# Patient Record
Sex: FEMALE | Race: WHITE | NOT HISPANIC OR LATINO | Employment: OTHER | URBAN - METROPOLITAN AREA
[De-identification: names, ages, dates, MRNs, and addresses within clinical notes are randomized per-mention and may not be internally consistent; named-entity substitution may affect disease eponyms.]

---

## 2017-01-04 DIAGNOSIS — Z76.82 AWAITING ORGAN TRANSPLANT STATUS: Primary | ICD-10-CM

## 2017-02-16 ENCOUNTER — LAB VISIT (OUTPATIENT)
Dept: LAB | Facility: HOSPITAL | Age: 67
End: 2017-02-16
Payer: MEDICARE

## 2017-02-16 DIAGNOSIS — Z76.82 AWAITING ORGAN TRANSPLANT STATUS: ICD-10-CM

## 2017-02-16 PROCEDURE — 86832 HLA CLASS I HIGH DEFIN QUAL: CPT | Mod: PO

## 2017-02-16 PROCEDURE — 86833 HLA CLASS II HIGH DEFIN QUAL: CPT | Mod: PO

## 2017-03-17 LAB — HPRA INTERPRETATION: NORMAL

## 2017-03-30 LAB
CLASS I ANTIBODY COMMENTS - LUMINEX: NORMAL
CLASS II ANTIBODIES - LUMINEX: NEGATIVE
CPRA %: 0
SERUM COLLECTION DT - LUMINEX CLASS I: NORMAL
SERUM COLLECTION DT - LUMINEX CLASS II: NORMAL
SPCL1 TESTING DATE: NORMAL
SPCL2 TESTING DATE: NORMAL

## 2017-05-09 ENCOUNTER — LAB VISIT (OUTPATIENT)
Dept: LAB | Facility: HOSPITAL | Age: 67
End: 2017-05-09
Payer: MEDICARE

## 2017-05-09 DIAGNOSIS — Z76.82 AWAITING ORGAN TRANSPLANT STATUS: ICD-10-CM

## 2017-06-08 ENCOUNTER — LAB VISIT (OUTPATIENT)
Dept: LAB | Facility: HOSPITAL | Age: 67
End: 2017-06-08
Payer: MEDICARE

## 2017-06-08 DIAGNOSIS — Z76.82 AWAITING ORGAN TRANSPLANT STATUS: ICD-10-CM

## 2017-06-20 LAB — HPRA INTERPRETATION: NORMAL

## 2017-06-20 PROCEDURE — 86829 HLA CLASS I/II ANTIBODY QUAL: CPT | Mod: PO,TXP

## 2017-06-20 PROCEDURE — 86829 HLA CLASS I/II ANTIBODY QUAL: CPT | Mod: 91,PO,TXP

## 2017-07-05 DIAGNOSIS — Z76.82 ORGAN TRANSPLANT CANDIDATE: Primary | ICD-10-CM

## 2017-07-05 NOTE — PROGRESS NOTES
YEARLY LIST MANAGEMENT NOTE    Casandra Rankingoodasael's kidney transplant listing status reviewed.  Patient is due for follow-up appointments in August 2017.  Appointments will be scheduled per protocol.  HLA sample is current and being rec'd on a regular basis.

## 2017-07-11 NOTE — Clinical Note
July 11, 2017        Casandra Pack  [address]  [place] [address]         Dear Casandra Pack:    As part of our commitment to share important information with our transplant patients, we want to provide you with the most current kidney and kidney/pancreas transplant outcomes at the [facility] as published bi-annually by the Scientific Registry for Organ Recipients (SRTR).    For kidney transplants performed between 1/1/2014 and 6/30/2016 the 1-year patient and graft survival rates are as follows:   Kidney-Cadaveric Donor Kidney-Living Donor Kidney/Pancreas   Adults [facility] 1-Year Expected 1-Year [facility] 1-Year [facility] 1-Year Expected 1-Year National 1-Year [facility] 1-Year Expected 1-Year National 1-Year   Graft Survival 93.09% 94.31% 93.92% 97.83% 97.54% 97.75% 98.18% 96.73% 96.15%   Patient Survival 96.56% 96.91% 96.57% 100% 98.88% 98.83% 98.18% 97.52% 97.54%         To learn more about transplant outcomes in the [place] you can go to www.srtr.org.     Please call the Kidney Transplant Office at [phone] or [phone] should you have any questions or if:     Your insurance changes in any way   Your address or phone number changes   There are changes in your health   You are in the hospital or Emergency Room for any reason      Sincerely,  Kidney Transplant Team

## 2017-07-11 NOTE — LETTER
IMPORTANT      July 13, 2017    Casandra Pack  [address]  [place] [address]     Re: [id number]    Dear Mr/Mrs Pack,    Thank you for choosing [facility] as your health care provider.  We are committed to assisting you with timely insurance filing and payment of your account.  To protect your liability, updated insurance information must be given to us at the time of service and we should be notified immediately if      · Your insurance benefits/plan changes.   You become eligible for any other benefits   Your current plan/coverage terms.    Also, please bring in a copy of your insurance premium payment if you have one of the following types of insurance:    · Coverage from a skilled nursing plan.  · Coverage from the Affordable Healthcare Act Plan.  · Coverage from a COBRA plan  · Premium paid by the National Kidney Foundation.    To ensure we have the correct insurance (Medical/Pharmacy) on file and to answer any questions regarding your benefits, please call us at [phone] or [phone] and ask to speak to the kidney  indicated below:      Transplant Dept  Rukhsana Hunterney   Heart   Kerri Wilmamimaru   Kidney (A-K) and Lung  Matming Mtzclaudio    Kidney (L-Z)  Dilia Arredondo   Liver    We look forward to hearing from you soon.    Sincerely,      Transplant Financial Services

## 2017-07-13 NOTE — ADDENDUM NOTE
Encounter addended by: Yenny Elena on: 7/13/2017 10:22 AM<BR>    Actions taken: Letter status changed

## 2017-07-14 LAB — HPRA INTERPRETATION: NORMAL

## 2017-07-14 PROCEDURE — 86829 HLA CLASS I/II ANTIBODY QUAL: CPT | Mod: PO,TXP

## 2017-07-14 PROCEDURE — 86829 HLA CLASS I/II ANTIBODY QUAL: CPT | Mod: 91,PO,TXP

## 2017-08-01 DIAGNOSIS — Z76.82 ORGAN TRANSPLANT CANDIDATE: ICD-10-CM

## 2017-08-17 ENCOUNTER — LAB VISIT (OUTPATIENT)
Dept: LAB | Facility: HOSPITAL | Age: 67
End: 2017-08-17
Payer: MEDICARE

## 2017-08-17 DIAGNOSIS — Z76.82 AWAITING ORGAN TRANSPLANT STATUS: ICD-10-CM

## 2017-08-17 PROCEDURE — 86829 HLA CLASS I/II ANTIBODY QUAL: CPT | Mod: 91,PO,TXP

## 2017-08-17 PROCEDURE — 86829 HLA CLASS I/II ANTIBODY QUAL: CPT | Mod: PO,TXP

## 2017-09-11 LAB — HPRA INTERPRETATION: NORMAL

## 2017-09-13 ENCOUNTER — LAB VISIT (OUTPATIENT)
Dept: LAB | Facility: HOSPITAL | Age: 67
End: 2017-09-13
Payer: MEDICARE

## 2017-09-13 DIAGNOSIS — Z76.82 AWAITING ORGAN TRANSPLANT STATUS: ICD-10-CM

## 2017-09-13 PROCEDURE — 86829 HLA CLASS I/II ANTIBODY QUAL: CPT | Mod: 91,PO,TXP

## 2017-09-13 PROCEDURE — 86829 HLA CLASS I/II ANTIBODY QUAL: CPT | Mod: PO,TXP

## 2017-09-21 DIAGNOSIS — Z76.82 ORGAN TRANSPLANT CANDIDATE: Primary | ICD-10-CM

## 2017-09-27 LAB — HPRA INTERPRETATION: NORMAL

## 2017-10-06 ENCOUNTER — LAB VISIT (OUTPATIENT)
Dept: LAB | Facility: HOSPITAL | Age: 67
End: 2017-10-06
Payer: MEDICARE

## 2017-10-06 DIAGNOSIS — Z76.82 AWAITING ORGAN TRANSPLANT STATUS: ICD-10-CM

## 2017-11-06 LAB — HPRA INTERPRETATION: NORMAL

## 2017-11-06 PROCEDURE — 86829 HLA CLASS I/II ANTIBODY QUAL: CPT | Mod: 91,PO,TXP

## 2017-11-06 PROCEDURE — 86829 HLA CLASS I/II ANTIBODY QUAL: CPT | Mod: PO,TXP

## 2017-11-10 PROCEDURE — 86832 HLA CLASS I HIGH DEFIN QUAL: CPT | Mod: PO,TXP

## 2017-11-10 PROCEDURE — 86833 HLA CLASS II HIGH DEFIN QUAL: CPT | Mod: PO,TXP

## 2017-11-20 LAB
CLASS I ANTIBODY COMMENTS - LUMINEX: NORMAL
CLASS II ANTIBODIES - LUMINEX: NORMAL
CLASS II ANTIBODY COMMENTS - LUMINEX: NORMAL
CPRA %: 4
SERUM COLLECTION DT - LUMINEX CLASS I: NORMAL
SERUM COLLECTION DT - LUMINEX CLASS II: NORMAL
SPCL1 TESTING DATE: NORMAL
SPCL2 TESTING DATE: NORMAL
SPCLU TESTING DATE: NORMAL

## 2017-12-15 ENCOUNTER — HOSPITAL ENCOUNTER (OUTPATIENT)
Dept: RADIOLOGY | Facility: HOSPITAL | Age: 67
Discharge: HOME | End: 2017-12-15
Attending: NURSE PRACTITIONER
Payer: MEDICARE

## 2017-12-15 ENCOUNTER — OFFICE VISIT (OUTPATIENT)
Dept: TRANSPLANT | Facility: CLINIC | Age: 67
End: 2017-12-15
Payer: MEDICARE

## 2017-12-15 ENCOUNTER — HOSPITAL ENCOUNTER (OUTPATIENT)
Dept: CARDIOLOGY | Facility: CLINIC | Age: 67
Discharge: HOME | End: 2017-12-15
Payer: MEDICARE

## 2017-12-15 ENCOUNTER — OFFICE VISIT (OUTPATIENT)
Dept: OBSTETRICS AND GYNECOLOGY | Facility: CLINIC | Age: 67
End: 2017-12-15
Payer: MEDICARE

## 2017-12-15 VITALS
BODY MASS INDEX: 34.69 KG/M2 | DIASTOLIC BLOOD PRESSURE: 74 MMHG | HEIGHT: 69 IN | WEIGHT: 234.19 LBS | SYSTOLIC BLOOD PRESSURE: 128 MMHG

## 2017-12-15 VITALS
OXYGEN SATURATION: 97 % | RESPIRATION RATE: 16 BRPM | HEIGHT: 67 IN | BODY MASS INDEX: 36.95 KG/M2 | SYSTOLIC BLOOD PRESSURE: 155 MMHG | TEMPERATURE: 98 F | DIASTOLIC BLOOD PRESSURE: 69 MMHG | WEIGHT: 235.44 LBS | HEART RATE: 98 BPM

## 2017-12-15 DIAGNOSIS — N18.6 ESRD ON HEMODIALYSIS: Primary | Chronic | ICD-10-CM

## 2017-12-15 DIAGNOSIS — Z76.82 ORGAN TRANSPLANT CANDIDATE: ICD-10-CM

## 2017-12-15 DIAGNOSIS — E11.21 TYPE 2 DIABETES MELLITUS WITH DIABETIC NEPHROPATHY, UNSPECIFIED LONG TERM INSULIN USE STATUS: Chronic | ICD-10-CM

## 2017-12-15 DIAGNOSIS — Z01.419 WELL WOMAN EXAM WITH ROUTINE GYNECOLOGICAL EXAM: Primary | ICD-10-CM

## 2017-12-15 DIAGNOSIS — K27.4 PEPTIC ULCER DISEASE WITH HEMORRHAGE: ICD-10-CM

## 2017-12-15 DIAGNOSIS — I35.0 NONRHEUMATIC AORTIC VALVE STENOSIS: ICD-10-CM

## 2017-12-15 DIAGNOSIS — Z76.82 AWAITING ORGAN TRANSPLANT STATUS: ICD-10-CM

## 2017-12-15 DIAGNOSIS — Z86.19 HISTORY OF STAPH INFECTION: Chronic | ICD-10-CM

## 2017-12-15 DIAGNOSIS — L40.9 PSORIASIS: ICD-10-CM

## 2017-12-15 DIAGNOSIS — E03.9 ACQUIRED HYPOTHYROIDISM: ICD-10-CM

## 2017-12-15 DIAGNOSIS — Z99.2 ESRD ON HEMODIALYSIS: Primary | Chronic | ICD-10-CM

## 2017-12-15 LAB
DIASTOLIC DYSFUNCTION: YES
ESTIMATED PA SYSTOLIC PRESSURE: 37.34
RETIRED EF AND QEF - SEE NOTES: 65 (ref 55–65)
TRICUSPID VALVE REGURGITATION: ABNORMAL

## 2017-12-15 PROCEDURE — 71020 XR CHEST PA AND LATERAL: CPT | Mod: TC,TXP

## 2017-12-15 PROCEDURE — G0101 CA SCREEN;PELVIC/BREAST EXAM: HCPCS | Mod: S$PBB,,, | Performed by: OBSTETRICS & GYNECOLOGY

## 2017-12-15 PROCEDURE — 99999 PR PBB SHADOW E&M-EST. PATIENT-LVL III: CPT | Mod: PBBFAC,,, | Performed by: OBSTETRICS & GYNECOLOGY

## 2017-12-15 PROCEDURE — 71020 XR CHEST PA AND LATERAL: CPT | Mod: 26,TXP,, | Performed by: RADIOLOGY

## 2017-12-15 PROCEDURE — G0101 CA SCREEN;PELVIC/BREAST EXAM: HCPCS | Mod: PBBFAC | Performed by: OBSTETRICS & GYNECOLOGY

## 2017-12-15 PROCEDURE — 99213 OFFICE O/P EST LOW 20 MIN: CPT | Mod: PBBFAC,25 | Performed by: OBSTETRICS & GYNECOLOGY

## 2017-12-15 PROCEDURE — 99215 OFFICE O/P EST HI 40 MIN: CPT | Mod: S$PBB,TXP,, | Performed by: INTERNAL MEDICINE

## 2017-12-15 PROCEDURE — 99999 PR PBB SHADOW E&M-EST. PATIENT-LVL III: CPT | Mod: PBBFAC,TXP,, | Performed by: INTERNAL MEDICINE

## 2017-12-15 PROCEDURE — 99213 OFFICE O/P EST LOW 20 MIN: CPT | Mod: PBBFAC,25,27,TXP | Performed by: INTERNAL MEDICINE

## 2017-12-15 PROCEDURE — 93306 TTE W/DOPPLER COMPLETE: CPT | Mod: PBBFAC,TXP | Performed by: INTERNAL MEDICINE

## 2017-12-15 RX ORDER — TRAZODONE HYDROCHLORIDE 50 MG/1
50 TABLET ORAL NIGHTLY
COMMUNITY
Start: 2017-10-19 | End: 2020-08-07

## 2017-12-15 RX ORDER — FLUTICASONE FUROATE AND VILANTEROL TRIFENATATE 100; 25 UG/1; UG/1
1 POWDER RESPIRATORY (INHALATION)
COMMUNITY
Start: 2017-12-08 | End: 2020-09-21

## 2017-12-15 RX ORDER — DOXYCYCLINE 100 MG/1
CAPSULE ORAL
COMMUNITY
Start: 2017-10-20 | End: 2017-12-15

## 2017-12-15 RX ORDER — MOXIFLOXACIN HYDROCHLORIDE 400 MG/1
TABLET ORAL
COMMUNITY
Start: 2017-10-03 | End: 2017-12-15

## 2017-12-15 RX ORDER — PREDNISONE 50 MG/1
TABLET ORAL
COMMUNITY
Start: 2017-10-03 | End: 2017-12-15

## 2017-12-15 RX ORDER — HYDROCODONE BITARTRATE AND ACETAMINOPHEN 10; 325 MG/1; MG/1
TABLET ORAL
COMMUNITY
Start: 2017-10-04 | End: 2017-12-15

## 2017-12-15 RX ORDER — PREDNISONE 20 MG/1
TABLET ORAL
COMMUNITY
Start: 2017-10-20 | End: 2017-12-15

## 2017-12-15 RX ORDER — ASCORBIC ACID, THIAMINE, RIBOFLAVIN, NIACINAMIDE, PYRIDOXINE, FOLIC ACID, COBALAMIN, BIOTIN, PANTOTHENIC ACID 100; 1.5; 1.7; 20; 10; 1; 6; 300; 1 MG/1; MG/1; MG/1; MG/1; MG/1; MG/1; UG/1; UG/1; MG/1
TABLET, COATED ORAL
Refills: 11 | COMMUNITY
Start: 2017-10-19

## 2017-12-15 RX ORDER — LEFLUNOMIDE 20 MG/1
TABLET ORAL
COMMUNITY
Start: 2017-09-14 | End: 2019-03-15

## 2017-12-15 RX ORDER — BENZONATATE 100 MG/1
CAPSULE ORAL
Refills: 1 | COMMUNITY
Start: 2017-10-31 | End: 2017-12-15

## 2017-12-15 NOTE — PROGRESS NOTES
Transplant Recipient Adult Psychosocial Assessment Update    Casandra Pack  [address]   [place] [address]    Telephone Information:   Mobile [phone]   Home  [phone] (home)  Work  There is no work phone number on file.  E-mail  No e-mail address on record    Sex: female  YOB: 1950  Age: 67 y.o.    Encounter Date: 12/15/2017  [place] Citizen: yes  Primary Language: English   Needed: no    Emergency Contact:  Name: Daria Pack  Relationship: daughter  Address: [address] [place] [address]  Phone Numbers:  [phone] (home), n/a (work), [phone] (mobile)    Family/Social Support:   Number of dependents/: pt has a dog which will be cared for by family   Marital history:  once for43 yrs to current ; two children  Other family dynamics: Pt reports parents ; three  sisters and one brother who is living.   Pt reports supportive children and . Dtr is an RN and son is a NP.    Household Composition:    Pt and her , 67 y/o Rodriguez Pack live in their home together.  Rodriguez drives [phone].  They have a dog.    Do you and your caregivers have access to reliable transportation? yes  PRIMARY CAREGIVER: Daria Pack will be primary caregiver, phone number [phone].      provided in-depth information to patient and caregiver regarding pre- and post-transplant caregiver role.   strongly encourages patient and caregiver to have concrete plan regarding post-transplant care giving, including back-up caregiver(s) to ensure care giving needs are met as needed.    Patient and Caregiver states understanding all aspects of caregiver role/commitment and is able/willing/committed to being caregiver to the fullest extent necessary.    Patient and Caregiver verbalizes understanding of the education provided today and caregiver responsibilities.          remains available. Patient and Caregiver agree to contact  in a timely manner if concerns arise.      Able to take time off work without financial concerns: yes.     Additional Significant Others who will Assist with Transplant:  Name: Rodriguez Pack  Age: 66  City: [place] State: [place]  Relationship:   Does person drive? yes    Name: Billy Pack  [phone]  Age: 33  City: [place] State: [place]  Relationship: son  Does person drive? yes    Living Will: no  Healthcare Power of : no  Advance Directives on file: <<no information> per medical record.  Verbally reviewed LW/HCPA information.   provided patient with copy of LW/HCPA documents and provided education on completion of forms.    Living Donors: No. Education and resource information given to patient.    Highest Education Level: Attended College/Technical School  Reading Ability: college  Reports difficulty with: N/A  Learns Best By:  Verbal and written instruction     Status: no  VA Benefits: no     Working for Income: No  If no, reason not working: Patient Choice - Retired  Patient is retired from [employer]..    Spouse/Significant Other Employment: Retired    Disabled: yes: date disability began: 2006, due to: ESRD.    Monthly Income:  Other: $Total household $3600.00  Able to afford all costs now and if transplanted, including medications: yes  Patient and Caregiver verbalizes understanding of personal responsibilities related to transplant costs and the importance of having a financial plan to ensure that patients transplant costs are fully covered.       provided fundraising information/education. Patient and Caregiververbalizes understanding.   remains available.    Insurance:   Payor/Plan Subscr  Sex Relation Sub. Ins. ID Effective Group Num   1. MEDICARE - [place]* TITUS PACK 1950 Female  [id number]                                    [address]   2. [facility]* TITUS MILLAN 1950 Female  [id number] 1/1/17                                    [address]       Primary Insurance (for UNOS reporting): Public Insurance - Medicare FFS (Fee For Service)  Secondary Insurance (for UNOS reporting): Private Insurance  Patient and Caregiver verbalizes clear understanding that patient may experience difficulty obtaining and/or be denied insurance coverage post-surgery. This includes and is not limited to disability insurance, life insurance, health insurance, burial insurance, long term care insurance, and other insurances.      Patient and Caregiver also reports understanding that future health concerns related to or unrelated to transplantation may not be covered by patient's insurance.  Resources and information provided and reviewed.     Patient and Caregiver provides verbal permission to release any necessary information to outside resources for patient care and discharge planning.  Resources and information provided are reviewed.      Dialysis Adherence: Patient reports good compliance.  Pt has a history of good dialysis compliance reports.  Infusion Service: patient utilizing? no  Home Health: patient utilizing? no  DME: no  Pulmonary/Cardiac Rehab: none   ADLS:  Pt states ability to independently accomplish all adls.    Adherence:   Pt states current and expected compliance with all healthcare recommendations..  Adherence education and counseling provided.     Per History Section:  Past Medical History:   Diagnosis Date    Acquired hypothyroidism 8/17/2016    Chronic rheumatic arthritis     Remicade 9029-0227, stopped d/t recurrent infection (skin abscesses)    Diabetes mellitus, type 2 since 1978 (age 28)     ESRD from DM; since Mar 2016 on hemodialysis     Peptic ulcer disease with hemorrhage 2016    Psoriasis      Social History   Substance Use Topics    Smoking status: Never Smoker    Smokeless tobacco: Never Used    Alcohol use  No     History   Drug Use No     History   Sexual Activity    Sexual activity: Not Currently    Partners: Male    Birth control/ protection: Post-menopausal       Per Today's Psychosocial:  Tobacco: none, patient denies any use.  Alcohol: none, patient denies any use.  Illicit Drugs/Non-prescribed Medications: none, patient denies any use.    Patient and Caregiver states clear understanding of the potential impact of substance use as it relates to transplant candidacy and is aware of possible random substance screening.  Substance abstinence/cessation counseling, education and resources provided and reviewed.     Arrests/DWI/Treatment/Rehab: patient denies    Psychiatric History:    Mental Health: depression  Psychiatrist/Counselor: PCP prescribes antidepressants and ambien.  Medications:  lexapro and trazadone; she states she does not seem to worry about certain things as much.  Suicide/Homicide Issues: Pt denies current or past suicidal/homicidal ideation.   Safety at home: Pt denies any home safety concerns.    Knowledge: Patient and Caregiver states having clear understanding and realistic expectations regarding the potential risks and potential benefits of organ transplantation and organ donation and agrees to discuss with health care team members and support system members, as well as to utilize available resources and express questions and/or concerns in order to further facilitate the pt informed decision-making.  Resources and information provided and reviewed.    Patient and Caregiver is aware of [facility]'s affiliation and/or partnership with agencies in home health care, LTAC, SNF, [facility], and other hospitals and clinics.    Understanding: Patient and Caregiver reports having a clear understanding of the many lifetime commitments involved with being a transplant recipient, including costs, compliance, medications, lab work, procedures, appointments, concrete and financial planning, preparedness, timely and  "appropriate communication of concerns, abstinence (ETOH, tobacco, illicit non-prescribed drugs), adherence to all health care team recommendations, support system and caregiver involvement, appropriate and timely resource utilization and follow-through, mental health counseling as needed/recommended, and patient and caregiver responsibilities.  Social Service Handbook, resources and detailed educational information provided and reviewed.  Educational information provided.    Patient and Caregiver also reports current and expected compliance with health care regime and states having a clear understanding of the importance of compliance.      Patient and Caregiver reports a clear understanding that risks and benefits may be involved with organ transplantation and with organ donation.       Patient and Caregiver also reports clear understanding that psychosocial risk factors may affect patient, and include but are not limited to feelings of depression, generalized anxiety, anxiety regarding dependence on others, post traumatic stress disorder, feelings of guilt and other emotional and/or mental concerns, and/or exacerbation of existing mental health concerns.  Detailed resources provided and discussed.      Patient and Caregiver agrees to access appropriate resources in a timely manner as needed and/or as recommended, and to communicate concerns appropriately.  Patient and Caregiver also reports a clear understanding of treatment options available.     Patient and Caregiver received education in a group setting.   reviewed education, provided additional information, and answered questions.    Feelings or Concerns: Pt states she is concerned about being away from home for an extended period of time.     Coping:   Pt stated she is coping with dialysis and being listed for transplant.  Pt enjoys shopping, playing [employer], going to the [employer] and crocheting.  Stated she is "just waiting" for " transplant.    Goals: Feel better; get an enjoyable part time job.  Get off dialysis. Patient referred to Vocational Rehabilitation.    Interview Behavior: Patient and Caregiver presents as alert and oriented x 4, pleasant, good eye contact, well groomed, recall good, concentration/judgement good, average intelligence, calm, communicative, cooperative, asking and answering questions appropriately.   She was accompanied by her dtr Daria, who appeared to be supportive of pt's pursuit of transplant..          Transplant Social Work - Candidacy  Assessment/Plan:     Psychosocial Suitability: Patient presents as an acceptable candidate for kidney transplant at this time. Based on psychosocial risk factors, patient presents as low risk, due to absence of serious psychosocial risk factors.  Final risk assessment pending dialysis unit compliance check.      Recommendations/Additional Comments: Fundraising.  Pt would benefit from [address].    Meg Castanon LCSW

## 2017-12-15 NOTE — LETTER
December 15, 2017        Mara Tomas  [address]  [place] [address]  Phone: [phone]  Fax: [phone]             [facility]  [address]  [place] [address]  Phone: [phone]   Patient: Casandra Pack   MR Number: [id number]   YOB: 1950   Date of Visit: 12/15/2017       Dear Dr. Mara Tomas    Thank you for referring Casandra Pack to me for evaluation. Attached you will find relevant portions of my assessment and plan of care.    If you have questions, please do not hesitate to call me. I look forward to following Casandra Pack along with you.    Sincerely,    Oriana Stout MD    Enclosure    If you would like to receive this communication electronically, please contact [email or web address] or [phone] to request [employer] Link access.    [employer] Link is a tool which provides read-only access to select patient information with whom you have a relationship. Its easy to use and provides real time access to review your patients record including encounter summaries, notes, results, and demographic information.    If you feel you have received this communication in error or would no longer like to receive these types of communications, please e-mail [email or web address]

## 2017-12-15 NOTE — PROGRESS NOTES
Kidney Transplant Recipient Reevalulation    Referring Physician: Mara Tomas  Current Nephrologist: Mara Tomas  Waitlist Status: active  Dialysis Start Date: 3/17/2016    Subjective:     CC:  Annual reassessment of kidney transplant candidacy.    HPI:  Ms. Pack is a 67 y.o. year old White female with ESRD secondary to diabetic nephropathy (DM diagnosed at age 28).  She has been on the wait list for a kidney transplant at [facility] since 3/17/2016. Patient is currently on hemodialysis started on 3/17/16. Patient is dialyzing on TTS schedule.  Patient reports that she is tolerating dialysis well. States she will hypotension to the 90s/40s standing after dialysis couple times a month. She has a LUE AV fistula. Patient was last seen in initial RR on 8/17/16. She denies any recent hospitalizations or ED visits.    She is accompanied to visit by her daughter.     She doesn't really exercise. She had PNA from Sept until the beginning of Dec 2017 thus states she wasn't as active. She lives in a 1 story house without any stairs to climb. She will do bathroom cleaning, grocery shopping, and cooking but her  does the rest of the household chores. Occasionally she will have lower back pain but states she walks around the grocery store. Doesn't use a walker or cane.     Review of Systems   Constitutional: +fatigue bradley after dialysis; Denies fever/chills, night sweats  EENT: +vision problems; +wears eye glasses; Denies hearing problems, trouble swallowing.   Respiratory: Denies shortness of breath, dyspnea on exertion, orthopnea, wheezing, hemoptysis, denies known TB exposure or history of positive TB skin test  Cardiovascular: Denies chest pain, palpitations, history of MI, history of stroke, history of DVT  Gastrointestinal: Denies abdominal pain, nausea/vomiting/diarrhea/constipation. Denies history of GI bleeding or ulcers.   Genitourinary: +estimates residual UOP as 2 cups/day; +history of  "recurrent UTIs - estimates 10 lifetime UTIs but states they were occurring more often when she was working and holding her urine for long periods of time at work - retired since 2002; last one was ~8 months ago; Denies history of kidney stones, history of urinary obstruction, hematuria, dysuria, urinary frequency, incontinence  Musculoskeletal: +occasional back pain; RA - in remission, on leflunomide; Denies trouble moving extremities, denies joint pain or swelling  Skin: Denies history of skin cancer, denies rash, ulcerations  Heme/onc: Denies any history of cancer, denies history of coagulopathies or bleeding disorders  Endocrine: +thyroid disease; Denies unintentional weight loss/weight gain  Neurological: Denies tremors, seizures, dizziness, headaches, peripheral neuropathy  Psychiatric: +daughter states she is a "worry wort"; Denies anxiety, depression, SI/HI. Denies hallucinations or delusions.    Potential Donors: no     Medications:   Current Outpatient Prescriptions   Medication Sig Dispense Refill    allopurinol (ZYLOPRIM) 100 MG tablet Take 100 mg by mouth 3 (three) times a week.      amlodipine (NORVASC) 5 MG tablet Take 5 mg by mouth 2 (two) times daily.      atorvastatin (LIPITOR) 80 MG tablet Take 80 mg by mouth once daily.      cholecalciferol, vitamin D3, (VITAMIN D3) 2,000 unit Cap Take 1 capsule by mouth once a week.      DIALYVITE 100-1 mg Tab TK 1 T PO  QD  11    escitalopram oxalate (LEXAPRO) 5 MG Tab Take 5 mg by mouth once daily.      insulin glargine (LANTUS) 100 unit/mL injection Inject 35 Units into the skin every evening.      insulin lispro (HUMALOG) 100 unit/mL injection Inject 25 Units into the skin 3 (three) times daily before meals.      leflunomide (ARAVA) 20 MG Tab       levothyroxine (SYNTHROID, LEVOTHROID) 175 MCG tablet Take 175 mcg by mouth once daily.      pantoprazole (PROTONIX) 40 MG tablet Take 40 mg by mouth once daily.      traZODone (DESYREL) 50 MG tablet    " "   BREO ELLIPTA 100-25 mcg/dose diskus inhaler        No current facility-administered medications for this visit.          Objective:   body mass index is 36.83 kg/m².   BP (!) 155/69 (BP Location: Right arm, Patient Position: Sitting, BP Method: Medium (Automatic))   Pulse 98   Temp 98 °F (36.7 °C) (Oral)   Resp 16   Ht 5' 7.05" (1.703 m)   Wt 106.8 kg (235 lb 7.2 oz)   SpO2 97%   BMI 36.83 kg/m²       Physical Exam   General: No acute distress, well groomed, alert and oriented x 3  HEENT: Normocephalic, atraumatic, PERRLA, sclera anicteric, conjunctiva/corneas clear, EOM's intact bilaterally, external inspection of ears and nose normal, moist mucous membranes, no oral ulcerations/lesions   Neck: Supple, symmetrical, trachea midline, no masses, no carotid bruits, no JVD, thyroid is normal without nodules or enlargement   Respiratory: Clear to auscultation bilaterally, respirations unlabored, no rales/rhonchi/wheezing   Cardiovacular: Regular rate and rhythm, S1, S2 normal, no murmurs, no rubs or gallops   Gastrointestinal: Soft, obese, non-tender, bowel sounds normal, no masses, no palpable organomegaly  Extremities: No clubbing or cyanosis of upper extremities bilaterally, no pedal edema bilaterally; +2 bilateral radial pulses  Skin: warm and dry; no rash on exposed skin  Lymph nodes: Cervical and supraclavicular nodes normal   Neurologic: No focal neurologic deficits.   Musculoskeletal: moves all extremities without difficulty, FROM, 5/5 strength, ambulates without an assistive device  Psychiatric: Normal mood and affect. Responds appropriately to questions.  Access: LUE AVF +thrill/bruit     Labs:  Lab Results   Component Value Date    WBC 7.67 08/17/2016    HGB 11.6 (L) 08/17/2016    HCT 35.8 (L) 08/17/2016     08/17/2016    K 4.4 08/17/2016     08/17/2016    CO2 22 (L) 08/17/2016    BUN 44 (H) 08/17/2016    CREATININE 4.6 (H) 08/17/2016    EGFRNONAA 9.5 (A) 08/17/2016    CALCIUM 9.1 " 08/17/2016    PHOS 4.5 08/17/2016    ALBUMIN 3.2 (L) 08/17/2016    AST 27 08/17/2016    ALT 19 08/17/2016    .0 (H) 08/17/2016       No results found for: PREALBUMIN, BILIRUBINUA, GGT, AMYLASE, LIPASE, PROTEINUA, NITRITE, RBCUA, WBCUA    No results found for: HLAABCTYPE    Lab Results   Component Value Date    CPRA 4 10/03/2017    EM2KMCY Negative 04/04/2017    CIABCLM WEAK A43 10/03/2017    CIIAB DR16 10/03/2017    ABCMT WEAK DR15 10/03/2017       Labs were reviewed with the patient.    Pre-transplant Workup:   Reviewed with the patient.    Assessment:     1. ESRD from DM; since Mar 2016 on hemodialysis    2. Type 2 diabetes mellitus with diabetic nephropathy, unspecified long term insulin use status    3. History of recurrent skin staph infection    4. Acquired hypothyroidism    5. Psoriasis    6. Peptic ulcer disease with hemorrhage    7. Organ transplant candidate    8. Awaiting organ transplant status        Plan:     Transplant Candidacy:   Ms. Pack is a suitable kidney transplant candidate.  She remains in overall stable health, and will remain active on the transplant list.    Encouraged patient to seek living donors and have them contact the living donor coordinator.     Exercise: reminded Casandra of the importance of regular exercise for weight management, blood sugar and blood pressure management.  I also explained exercise has been shown to improve cardiovascular health, energy level, and sleep hygiene.  Lastly, I advised her that cardiovascular complications are leading cause of death for renal transplant recipients, and regular exercise can help lower this risk.      Oriana Stout MD       Follow-up:   In addition to the tests noted in the plan, Ms. Pack will continue to have reevaluation as per the standing pre-kidney transplant protocol:  1. Monthly blood for PRA  2. Annual return to clinic, except HIV positive, > 65 years of age, or pancreas transplant candidates who will be  scheduled to see transplant every 6 months while in pre-transplant phase  3. Annual re-testing: CXR, EKG, yearly mammograms for women over 40 and PSA for males over 40, cardiology follow-up as recommended by initial cardiology pre-transplant evaluation  4. Renal ultrasound every 2 years  5. Baseline colonoscopy after age 50 and repeated as recommended    UNOS Patient Status  Functional Status: 70% - Cares for self: unable to carry on normal activity or active work  Physical Capacity: No Limitations

## 2017-12-15 NOTE — LETTER
December 15, 2017      Yrn Varela MD  [address]  [facility] [address]           [facility] 5th Floor  [address]  [facility] [address]  Phone: [phone]          Patient: Casandra Pack   MR Number: [id number]   YOB: 1950   Date of Visit: 12/15/2017       Dear Dr. Yrn Varela:    Thank you for referring Casandra Pack to me for evaluation. Attached you will find relevant portions of my assessment and plan of care.    If you have questions, please do not hesitate to call me. I look forward to following Casandra Pack along with you.    Sincerely,    Maryam Hutchison MD    Enclosure  CC:  No Recipients    If you would like to receive this communication electronically, please contact [email or web address] or [phone] to request more information on [employer] Link access.    For providers and/or their staff who would like to refer a patient to [facility], please contact us through our one-stop-shop provider referral line, [facility], at [phone].    If you feel you have received this communication in error or would no longer like to receive these types of communications, please e-mail [email or web address]

## 2017-12-15 NOTE — PROGRESS NOTES
History & Physical  Gynecology      SUBJECTIVE:     Chief Complaint: Gynecologic Exam       History of Present Illness:  Annual Exam-Postmenopausal  Patient presents for annual exam. The patient has no complaints today. Patient denies post-menopausal vaginal bleeding.   The patient is not sexually active. The patient is not taking hormone replacement therapy.    GYN screening history: last pap: approximate date  and was normal.  Patient has had a hysterectomy for bleeding.  Has never had an abnormal pap smear.  Last colonoscopy was , return three years per patient.  Last mammogram 2017 per patient.  DXA scan done this year with primary care doctor.      The patient participates in regular exercise: no.  Patient has long days in dialysis.  The patient is taking vitamin D supplementation.  She does not smoke.     FH:   Breast cancer: none  Colon cancer: mother and sister  Ovarian cancer: none    Review of patient's allergies indicates:   Allergen Reactions    Levaquin [levofloxacin]        Past Medical History:   Diagnosis Date    Acquired hypothyroidism 2016    Chronic rheumatic arthritis     Remicade 6148-0812, stopped d/t recurrent infection (skin abscesses)    Diabetes mellitus, type 2 since  (age 28)     ESRD from DM; since Mar 2016 on hemodialysis     Peptic ulcer disease with hemorrhage 2016    Psoriasis      Past Surgical History:   Procedure Laterality Date    APPENDECTOMY      AV FISTULA PLACEMENT Left     upper arm    CATARACT EXTRACTION Bilateral     CERVICAL SPINE SURGERY      for ruptured disc    CYST REMOVAL Left     calf, medial aspect LLE    gout deposit removal Right     foot    HYSTERECTOMY      for bleeding; ovaries in place    INCISION AND DRAINAGE OF WOUND      x 6 since     ROTATOR CUFF REPAIR Left     TONSILLECTOMY       OB History      Para Term  AB Living    2 2 2     2    SAB TAB Ectopic Multiple Live Births            2        Family  History   Problem Relation Age of Onset    Cancer Mother 55     colon    Colon cancer Mother     Aneurysm Father     Heart disease Sister     Diabetes Sister     Hypertension Sister     Heart disease Brother     Cancer Sister      lung    Diabetes Sister     Hypertension Sister     Cancer Sister 74     colon    Diabetes Sister     Hypertension Sister     Kidney disease Neg Hx     Breast cancer Neg Hx     Ovarian cancer Neg Hx      Social History   Substance Use Topics    Smoking status: Never Smoker    Smokeless tobacco: Never Used    Alcohol use No       Current Outpatient Prescriptions   Medication Sig    allopurinol (ZYLOPRIM) 100 MG tablet Take 100 mg by mouth 3 (three) times a week.    amlodipine (NORVASC) 5 MG tablet Take 5 mg by mouth 2 (two) times daily.    atorvastatin (LIPITOR) 80 MG tablet Take 80 mg by mouth once daily.    benzonatate (TESSALON) 100 MG capsule     BREO ELLIPTA 100-25 mcg/dose diskus inhaler     cholecalciferol, vitamin D3, (VITAMIN D3) 2,000 unit Cap Take 1 capsule by mouth once a week.    DIALYVITE 100-1 mg Tab TK 1 T PO  QD    doxycycline (VIBRAMYCIN) 100 MG Cap     escitalopram oxalate (LEXAPRO) 5 MG Tab Take 5 mg by mouth once daily.    folic acid (FOLVITE) 1 MG tablet Take 1 mg by mouth once daily.    hydrocodone-acetaminophen 10-325mg (NORCO)  mg Tab     insulin glargine (LANTUS) 100 unit/mL injection Inject 35 Units into the skin every evening.    insulin lispro (HUMALOG) 100 unit/mL injection Inject 25 Units into the skin 3 (three) times daily before meals.    leflunomide (ARAVA) 20 MG Tab     levothyroxine (SYNTHROID, LEVOTHROID) 175 MCG tablet Take 175 mcg by mouth once daily.    moxifloxacin (AVELOX) 400 mg tablet     mupirocin (BACTROBAN) 2 % ointment Apply to inside of nose 3 times daily x 5 days the week of surgery    pantoprazole (PROTONIX) 40 MG tablet Take 40 mg by mouth once daily.    predniSONE (DELTASONE) 20 MG tablet      predniSONE (DELTASONE) 50 MG Tab     torsemide (DEMADEX) 20 MG Tab Take 20 mg by mouth once daily.    traZODone (DESYREL) 50 MG tablet     zolpidem (AMBIEN) 10 mg Tab Take 5 mg by mouth nightly as needed.     No current facility-administered medications for this visit.        Review of Systems:  Review of Systems   Constitutional: Negative for appetite change, fever and unexpected weight change.   Respiratory: Negative for shortness of breath.    Cardiovascular: Negative for chest pain.   Gastrointestinal: Negative for constipation, diarrhea, nausea and vomiting.   Genitourinary: Negative for frequency, genital sores, pelvic pain, urgency, vaginal bleeding, vaginal discharge, vaginal pain, urinary incontinence, postmenopausal bleeding and vaginal odor.   Breast: Negative for breast mass, breast pain, nipple discharge and skin changes       OBJECTIVE:     Physical Exam:  Physical Exam   Constitutional: She is oriented to person, place, and time. She appears well-developed and well-nourished.   Pulmonary/Chest: Effort normal.   Abdominal: Soft.   Genitourinary: Vagina normal. No labial fusion. There is no rash, tenderness, lesion or injury on the right labia. There is no rash, tenderness, lesion or injury on the left labia. Right adnexum displays no mass, no tenderness and no fullness. Left adnexum displays no mass, no tenderness and no fullness. No erythema, tenderness or bleeding in the vagina. No foreign body in the vagina. No signs of injury around the vagina. No vaginal discharge found.   Genitourinary Comments: prolapse   Neurological: She is alert and oriented to person, place, and time.   Psychiatric: She has a normal mood and affect. Her behavior is normal. Judgment and thought content normal.   Nursing note and vitals reviewed.        ASSESSMENT:       ICD-10-CM ICD-9-CM    1. Well woman exam with routine gynecological exam Z01.419 V72.31           Plan:      Casandra was seen today for gynecologic  exam.    Diagnoses and all orders for this visit:    Well woman exam with routine gynecological exam        No orders of the defined types were placed in this encounter.      Well Woman:   - Pap smear: no longer required  - Mammogram: up to date  - Colonoscopy: up to date  - Dexa: up to date  - Immunizations: primary care to manage  - Labs: primary care to manage  - Calcium/Vitamin D counseling provided      Return in about 2 years (around 12/15/2019) for annual or prn.    Maryam Hutchison

## 2017-12-20 NOTE — PROGRESS NOTES
Patient's chart reviewed today. Patient due for follow-up in June 2018. Pt stated mmg completed 6/2017 & will fax copy of results from [facility]. Future appointments will be scheduled per protocol. HLA sample current, in lab, and being received on a regular basis.

## 2018-01-15 ENCOUNTER — LAB VISIT (OUTPATIENT)
Dept: LAB | Facility: HOSPITAL | Age: 68
End: 2018-01-15
Payer: MEDICARE

## 2018-01-15 DIAGNOSIS — Z76.82 AWAITING ORGAN TRANSPLANT STATUS: ICD-10-CM

## 2018-01-15 PROCEDURE — 86832 HLA CLASS I HIGH DEFIN QUAL: CPT | Mod: PO,TXP

## 2018-01-15 PROCEDURE — 86833 HLA CLASS II HIGH DEFIN QUAL: CPT | Mod: PO,TXP

## 2018-01-24 LAB — HPRA INTERPRETATION: NORMAL

## 2018-02-06 LAB
CLASS I ANTIBODIES - LUMINEX: NEGATIVE
CLASS II ANTIBODY COMMENTS - LUMINEX: NORMAL
CPRA %: 0
SERUM COLLECTION DT - LUMINEX CLASS I: NORMAL
SERUM COLLECTION DT - LUMINEX CLASS II: NORMAL
SPCL1 TESTING DATE: NORMAL
SPCL2 TESTING DATE: NORMAL
SPCLU TESTING DATE: NORMAL

## 2018-04-06 ENCOUNTER — LAB VISIT (OUTPATIENT)
Dept: LAB | Facility: HOSPITAL | Age: 68
End: 2018-04-06
Payer: MEDICARE

## 2018-04-06 DIAGNOSIS — Z76.82 ORGAN TRANSPLANT CANDIDATE: ICD-10-CM

## 2018-04-06 PROCEDURE — 86832 HLA CLASS I HIGH DEFIN QUAL: CPT | Mod: PO,TXP

## 2018-04-06 PROCEDURE — 86833 HLA CLASS II HIGH DEFIN QUAL: CPT | Mod: PO,TXP

## 2018-04-10 LAB — HPRA INTERPRETATION: NORMAL

## 2018-05-18 DIAGNOSIS — Z76.82 ORGAN TRANSPLANT CANDIDATE: Primary | ICD-10-CM

## 2018-09-13 ENCOUNTER — HOSPITAL ENCOUNTER (OUTPATIENT)
Dept: RADIOLOGY | Facility: HOSPITAL | Age: 68
Discharge: HOME | End: 2018-09-13
Attending: TRANSPLANT SURGERY
Payer: MEDICARE

## 2018-09-13 ENCOUNTER — HOSPITAL ENCOUNTER (OUTPATIENT)
Dept: RADIOLOGY | Facility: HOSPITAL | Age: 68
Discharge: HOME | End: 2018-09-13
Attending: NURSE PRACTITIONER
Payer: MEDICARE

## 2018-09-13 ENCOUNTER — TELEPHONE (OUTPATIENT)
Dept: CARDIOLOGY | Facility: CLINIC | Age: 68
End: 2018-09-13

## 2018-09-13 DIAGNOSIS — Z76.82 ORGAN TRANSPLANT CANDIDATE: ICD-10-CM

## 2018-09-13 PROCEDURE — 93978 VASCULAR STUDY: CPT | Mod: TC,TXP

## 2018-09-13 PROCEDURE — 76770 US EXAM ABDO BACK WALL COMP: CPT | Mod: 26,TXP,, | Performed by: RADIOLOGY

## 2018-09-13 PROCEDURE — 93978 VASCULAR STUDY: CPT | Mod: 26,TXP,, | Performed by: RADIOLOGY

## 2018-09-13 PROCEDURE — 71046 X-RAY EXAM CHEST 2 VIEWS: CPT | Mod: 26,TXP,, | Performed by: RADIOLOGY

## 2018-09-13 PROCEDURE — 71046 X-RAY EXAM CHEST 2 VIEWS: CPT | Mod: TC,TXP

## 2018-09-13 PROCEDURE — 76770 US EXAM ABDO BACK WALL COMP: CPT | Mod: TC,TXP

## 2018-09-14 ENCOUNTER — OFFICE VISIT (OUTPATIENT)
Dept: TRANSPLANT | Facility: CLINIC | Age: 68
End: 2018-09-14
Payer: MEDICARE

## 2018-09-14 ENCOUNTER — CLINICAL SUPPORT (OUTPATIENT)
Dept: CARDIOLOGY | Facility: CLINIC | Age: 68
End: 2018-09-14
Attending: NURSE PRACTITIONER
Payer: MEDICARE

## 2018-09-14 VITALS
OXYGEN SATURATION: 98 % | RESPIRATION RATE: 18 BRPM | BODY MASS INDEX: 35.59 KG/M2 | DIASTOLIC BLOOD PRESSURE: 69 MMHG | SYSTOLIC BLOOD PRESSURE: 158 MMHG | HEIGHT: 69 IN | WEIGHT: 240.31 LBS | HEART RATE: 89 BPM | TEMPERATURE: 98 F

## 2018-09-14 DIAGNOSIS — Z76.82 ORGAN TRANSPLANT CANDIDATE: Primary | ICD-10-CM

## 2018-09-14 DIAGNOSIS — M06.9 RHEUMATOID ARTHRITIS OF HAND, UNSPECIFIED LATERALITY, UNSPECIFIED RHEUMATOID FACTOR PRESENCE: ICD-10-CM

## 2018-09-14 DIAGNOSIS — E03.9 ACQUIRED HYPOTHYROIDISM: ICD-10-CM

## 2018-09-14 DIAGNOSIS — N18.6 ESRD ON HEMODIALYSIS: Chronic | ICD-10-CM

## 2018-09-14 DIAGNOSIS — Z76.82 ORGAN TRANSPLANT CANDIDATE: ICD-10-CM

## 2018-09-14 DIAGNOSIS — E11.21 TYPE 2 DIABETES MELLITUS WITH DIABETIC NEPHROPATHY, UNSPECIFIED WHETHER LONG TERM INSULIN USE: Chronic | ICD-10-CM

## 2018-09-14 DIAGNOSIS — Z99.2 ESRD ON HEMODIALYSIS: Chronic | ICD-10-CM

## 2018-09-14 LAB — DIASTOLIC DYSFUNCTION: NO

## 2018-09-14 PROCEDURE — 78452 HT MUSCLE IMAGE SPECT MULT: CPT | Mod: PBBFAC,TXP | Performed by: INTERNAL MEDICINE

## 2018-09-14 PROCEDURE — 93018 CV STRESS TEST I&R ONLY: CPT | Mod: S$PBB,TXP,, | Performed by: INTERNAL MEDICINE

## 2018-09-14 PROCEDURE — 99999 PR PBB SHADOW E&M-EST. PATIENT-LVL V: CPT | Mod: PBBFAC,TXP,, | Performed by: NURSE PRACTITIONER

## 2018-09-14 PROCEDURE — 99215 OFFICE O/P EST HI 40 MIN: CPT | Mod: S$PBB,TXP,, | Performed by: NURSE PRACTITIONER

## 2018-09-14 PROCEDURE — 93016 CV STRESS TEST SUPVJ ONLY: CPT | Mod: S$PBB,TXP,, | Performed by: INTERNAL MEDICINE

## 2018-09-14 PROCEDURE — 99215 OFFICE O/P EST HI 40 MIN: CPT | Mod: PBBFAC,TXP | Performed by: NURSE PRACTITIONER

## 2018-09-14 RX ORDER — TORSEMIDE 5 MG/1
20 TABLET ORAL DAILY
COMMUNITY
End: 2020-08-07

## 2018-09-14 RX ORDER — SEVELAMER CARBONATE 800 MG/1
2400 TABLET, FILM COATED ORAL
COMMUNITY

## 2018-09-14 NOTE — PROGRESS NOTES
Patient was seen in listed [facility] transplant clinic for continued evaluation for kidney, kidney/pancreas or pancreas only transplant. The patient attended a group education session that discussed/reviewed the following aspects of transplantation: evaluation and selection committee process, UNOS waitlist management/multiple listings, types of organs offered (KDPI < 85%, KDPI > 85%, PHS increased risk, DCD), financial aspects, surgical procedures, dietary instruction pre- and post-transplant, health maintenance pre- and post-transplant, post-transplant hospitalization and outpatient follow-up, potential to participate in a research protocol, and medication management and side effects. A question and answer session was provided after the presentation.     The patient was seen by all members of the multi-disciplinary team to include: Nephrologist/PA, , Transplant Coordinator, and .      The patient reviewed and signed all consents for evaluation which were witnessed and sent to scanning into the EPIC chart.     The patient was given an education book and plan for further evaluation based on her individual assessment.      The patient was encouraged to call with any questions or concerns.

## 2018-09-14 NOTE — PROGRESS NOTES
Kidney Transplant Recipient Reevalulation    Referring Physician: Mara Tomas  Current Nephrologist: Mara Tomas  Waitlist Status: active  Dialysis Start Date: 3/17/2016    Subjective:     CC:  Six month reassessment of kidney transplant candidacy.    HPI:  Ms. Pack is a 67 y.o. year old White female with ESRD secondary to diabetic nephropathy.  She has been on the wait list for a kidney transplant at [facility] since 3/17/2016. Patient is currently on hemodialysis started on 3/17/2016. Patient is dialyzing on TTS schedule.  Patient reports that she is tolerating dialysis well.. Dialyzes for 4 hours per session.  She has a LUE AV fistula. Patient denies any recent hospitalizations or ED visits.    Recent labs and diagnostic tests reviewed.   Does not exercise but will does hold chores and run errands.   Overall feels well. No health concerns today.   Denies chest pain, SOB, leg pain, abdominal pain or LUTs.    Past Medical History:   Diagnosis Date    Acquired hypothyroidism 8/17/2016    Chronic rheumatic arthritis     Remicade 8929-3045, stopped d/t recurrent infection (skin abscesses)    Diabetes mellitus, type 2 since 1978 (age 28)     ESRD from DM; since Mar 2016 on hemodialysis     Peptic ulcer disease with hemorrhage 2016    Psoriasis        Review of Systems   Constitutional: Negative for activity change, appetite change, chills, fatigue, fever and unexpected weight change.   HENT: Negative for congestion, facial swelling, postnasal drip, rhinorrhea, sinus pressure, sore throat and trouble swallowing.    Eyes: Positive for visual disturbance. Negative for pain and redness.        Wears glasses   Respiratory: Negative for cough, chest tightness, shortness of breath and wheezing.    Cardiovascular: Positive for leg swelling. Negative for chest pain and palpitations.   Gastrointestinal: Positive for abdominal distention. Negative for abdominal pain, diarrhea, nausea and vomiting.         "Obese abdomen    Genitourinary: Negative for dysuria, flank pain and urgency.   Musculoskeletal: Negative for gait problem, neck pain and neck stiffness.   Skin: Negative for rash.   Allergic/Immunologic: Negative for environmental allergies, food allergies and immunocompromised state.   Neurological: Negative for dizziness, weakness, light-headedness and headaches.   Psychiatric/Behavioral: Negative for agitation and confusion. The patient is not nervous/anxious.        Objective:   body mass index is 35.49 kg/m².  BP (!) 158/69 (BP Location: Right arm, Patient Position: Sitting, BP Method: Medium (Automatic))   Pulse 89   Temp 97.5 °F (36.4 °C) (Oral)   Resp 18   Ht 5' 9" (1.753 m)   Wt 109 kg (240 lb 4.8 oz)   SpO2 98%   BMI 35.49 kg/m²     Physical Exam   Constitutional: She is oriented to person, place, and time. She appears well-developed and well-nourished.   HENT:   Head: Normocephalic.   Mouth/Throat: Oropharynx is clear and moist. No oropharyngeal exudate.   Eyes: Conjunctivae and EOM are normal. Pupils are equal, round, and reactive to light. No scleral icterus.   Neck: Normal range of motion. Neck supple.   Cardiovascular: Normal rate, regular rhythm and normal heart sounds.   Pulmonary/Chest: Effort normal and breath sounds normal.   Abdominal: Soft. Normal appearance and bowel sounds are normal. She exhibits no distension and no mass. There is no splenomegaly or hepatomegaly. There is no tenderness. There is no rebound, no guarding, no CVA tenderness, no tenderness at McBurney's point and negative Cao's sign.   Musculoskeletal: Normal range of motion. She exhibits edema.        Arms:  bilateral trace peripheral edema    Lymphadenopathy:     She has no cervical adenopathy.   Neurological: She is alert and oriented to person, place, and time. She exhibits normal muscle tone. Coordination normal.   Skin: Skin is warm and dry.   Psychiatric: She has a normal mood and affect. Her behavior is " normal.   Vitals reviewed.      Labs:  Lab Results   Component Value Date    WBC 7.67 08/17/2016    HGB 11.6 (L) 08/17/2016    HCT 35.8 (L) 08/17/2016     08/17/2016    K 4.4 08/17/2016     08/17/2016    CO2 22 (L) 08/17/2016    BUN 44 (H) 08/17/2016    CREATININE 4.6 (H) 08/17/2016    EGFRNONAA 9.5 (A) 08/17/2016    CALCIUM 9.1 08/17/2016    PHOS 4.5 08/17/2016    ALBUMIN 3.2 (L) 08/17/2016    AST 27 08/17/2016    ALT 19 08/17/2016    .0 (H) 08/17/2016       No results found for: PREALBUMIN, BILIRUBINUA, GGT, AMYLASE, LIPASE, PROTEINUA, NITRITE, RBCUA, WBCUA    No results found for: HLAABCTYPE    Lab Results   Component Value Date    CPRA 4 07/10/2018    CPRA 4 07/10/2018    CPRA 0 07/10/2018    GM8FXDJ Negative 07/10/2018    CIABCLM WEAK A43 10/03/2017    CIIAB DR16 07/10/2018    ABCMT WEAK-- DR15 07/10/2018       Labs were reviewed with the patient.    Pre-transplant Workup:   Reviewed with the patient.    Assessment:     1. Organ transplant candidate    2. ESRD from DM; since Mar 2016 on hemodialysis    3. Type 2 diabetes mellitus with diabetic nephropathy, unspecified whether long term insulin use    4. Rheumatoid arthritis of hand, unspecified laterality, unspecified rheumatoid factor presence    5. Acquired hypothyroidism    6. BMI 35.0-35.9,adult        Plan:   Needs UTD MMG, last on on 8/2017 Pt will get done in 10/2018  colonoscopy due 3/2020  GYN apt due 12/2018  S/P hysterectomy 1989--does not need PAP  NM stress today--results pending    Transplant Candidacy:   Ms. Pack is a suitable kidney transplant candidate.  She remains in overall stable health, and will remain active on the transplant list.    Kathryn Leal NP       Follow-up:   In addition to the tests noted in the plan, Ms. Pack will continue to have reevaluation as per the standing pre-kidney transplant protocol:  1. Monthly blood for PRA  2. Annual return to clinic, except HIV positive, > 65 years of age, or  pancreas transplant candidates who will be scheduled to see transplant every 6 months while in pre-transplant phase  3. Annual re-testing: CXR, EKG, yearly mammograms for women over 40 and PSA for males over 40, cardiology follow-up as recommended by initial cardiology pre-transplant evaluation  4. Renal ultrasound every 2 years  5. Baseline colonoscopy after age 50 and repeated as recommended    UNOS Patient Status  Functional Status: 60% - Requires occasional assistance but is able to care for needs  Physical Capacity: No Limitations

## 2018-09-14 NOTE — LETTER
September 17, 2018        Mara Tomas  [address]  [place] [address]  Phone: [phone]  Fax: [phone]             [facility]  [address]  [place] [address]  Phone: [phone]   Patient: Casandra Pack   MR Number: [id number]   YOB: 1950   Date of Visit: 9/14/2018       Dear Dr. Mara Tomas    Thank you for referring Casandra Pack to me for evaluation. Attached you will find relevant portions of my assessment and plan of care.    If you have questions, please do not hesitate to call me. I look forward to following Casandra Pack along with you.    Sincerely,    Kathryn Leal, NP    Enclosure    If you would like to receive this communication electronically, please contact [email or web address] or [phone] to request [employer] Link access.    [employer] Link is a tool which provides read-only access to select patient information with whom you have a relationship. Its easy to use and provides real time access to review your patients record including encounter summaries, notes, results, and demographic information.    If you feel you have received this communication in error or would no longer like to receive these types of communications, please e-mail [email or web address]

## 2018-09-17 NOTE — PROGRESS NOTES
Transplant Recipient Adult Psychosocial Assessment Update    Casandra Pack  [address]   [place] [address]    Telephone Information:   Mobile [phone]   Home  [phone] (home)  Work  There is no work phone number on file.  E-mail  No e-mail address on record    Sex: female  YOB: 1950  Age: 67 y.o.    Encounter Date: 2018  [place] Citizen: yes  Primary Language: English   Needed: no    Emergency Contact:  Name: Daria Pack  Relationship: daughter  Address: [address] [place] [address]  Phone Numbers:  [phone] (home), n/a (work), [phone] (mobile)    Family/Social Support:   Number of dependents/: pt has a dog which will be cared for by family   Marital history:  once for 44 yrs to current ; two children  Other family dynamics: Pt reports parents ; three  sisters and one brother who is living.   Pt reports supportive children and . Dtr is an RN and son is a NP.    Household Composition:    Pt and her , 68 y/o Rodriguez Pack live in their home together.  Rodriguez drives [phone].  They have a dog.    Do you and your caregivers have access to reliable transportation? yes  PRIMARY CAREGIVER: Daria Pack will be primary caregiver, phone number [phone].      provided in-depth information to patient and caregiver regarding pre- and post-transplant caregiver role.   strongly encourages patient and caregiver to have concrete plan regarding post-transplant care giving, including back-up caregiver(s) to ensure care giving needs are met as needed.    Patient and Caregiver states understanding all aspects of caregiver role/commitment and is able/willing/committed to being caregiver to the fullest extent necessary.    Patient and Caregiver verbalizes understanding of the education provided today and caregiver responsibilities.          remains available. Patient and Caregiver agree to contact  in a timely manner if concerns arise.      Able to take time off work without financial concerns: yes.     Additional Significant Others who will Assist with Transplant:  Name: Rodriguez Pack  Age: 67  City: [place] State: [place]  Relationship:   Does person drive? yes    Name: Billy Pack  [phone]  Age: 34  City: [place] State: [place]  Relationship: son  Does person drive? yes    Living Will: no  Healthcare Power of : no  Advance Directives on file: <<no information> per medical record.  Verbally reviewed LW/HCPA information.   provided patient with copy of LW/HCPA documents and provided education on completion of forms.    Living Donors: No. Education and resource information given to patient.    Highest Education Level: Attended College/Technical School  Reading Ability: college  Reports difficulty with: N/A  Learns Best By:  Verbal and written instruction     Status: no  VA Benefits: no     Working for Income: No  If no, reason not working: Patient Choice - Retired  Patient is retired from [employer]..    Spouse/Significant Other Employment: Retired    Disabled: yes: date disability began: 2006, due to: ESRD.    Monthly Income:  Other: $Total household $3600.00  Able to afford all costs now and if transplanted, including medications: yes  Patient and Caregiver verbalizes understanding of personal responsibilities related to transplant costs and the importance of having a financial plan to ensure that patients transplant costs are fully covered.       provided fundraising information/education. Patient and Caregiververbalizes understanding.   remains available.    Insurance:   Payor/Plan Subscr  Sex Relation Sub. Ins. ID Effective Group Num   1. MEDICARE - [place]* TITUS PACK 1950 Female  [id number]                                    [address]   2. [facility]* TITUS MILLAN 1950 Female  [id number] 1/1/17                                    [address]       Primary Insurance (for UNOS reporting): Public Insurance - Medicare FFS (Fee For Service)  Secondary Insurance (for UNOS reporting): Private Insurance  Patient and Caregiver verbalizes clear understanding that patient may experience difficulty obtaining and/or be denied insurance coverage post-surgery. This includes and is not limited to disability insurance, life insurance, health insurance, burial insurance, long term care insurance, and other insurances.      Patient and Caregiver also reports understanding that future health concerns related to or unrelated to transplantation may not be covered by patient's insurance.  Resources and information provided and reviewed.     Patient and Caregiver provides verbal permission to release any necessary information to outside resources for patient care and discharge planning.  Resources and information provided are reviewed.      Dialysis Adherence: Patient reports good compliance.  Pt has a history of good dialysis compliance reports.  Infusion Service: patient utilizing? no  Home Health: patient utilizing? no  DME: no  Pulmonary/Cardiac Rehab: none   ADLS:  Pt states ability to independently accomplish all adls.    Adherence:   Pt states current and expected compliance with all healthcare recommendations..  Adherence education and counseling provided.     Per History Section:  Past Medical History:   Diagnosis Date    Acquired hypothyroidism 8/17/2016    Chronic rheumatic arthritis     Remicade 7173-6253, stopped d/t recurrent infection (skin abscesses)    Diabetes mellitus, type 2 since 1978 (age 28)     ESRD from DM; since Mar 2016 on hemodialysis     Peptic ulcer disease with hemorrhage 2016    Psoriasis      Social History     Tobacco Use    Smoking status: Never Smoker    Smokeless tobacco: Never Used   Substance Use Topics     Alcohol use: No     Social History     Substance and Sexual Activity   Drug Use No     Social History     Substance and Sexual Activity   Sexual Activity Not Currently    Partners: Male    Birth control/protection: Post-menopausal       Per Today's Psychosocial:  Tobacco: none, patient denies any use.  Alcohol: none, patient denies any use.  Illicit Drugs/Non-prescribed Medications: none, patient denies any use.    Patient and Caregiver states clear understanding of the potential impact of substance use as it relates to transplant candidacy and is aware of possible random substance screening.  Substance abstinence/cessation counseling, education and resources provided and reviewed.     Arrests/DWI/Treatment/Rehab: patient denies    Psychiatric History:    Mental Health: depression  Psychiatrist/Counselor: PCP prescribes antidepressants and ambien.  Medications:  lexapro and trazadone; she states she does not seem to worry about certain things as much.  Suicide/Homicide Issues: Pt denies current or past suicidal/homicidal ideation.   Safety at home: Pt denies any home safety concerns.    Knowledge: Patient and Caregiver states having clear understanding and realistic expectations regarding the potential risks and potential benefits of organ transplantation and organ donation and agrees to discuss with health care team members and support system members, as well as to utilize available resources and express questions and/or concerns in order to further facilitate the pt informed decision-making.  Resources and information provided and reviewed.    Patient and Caregiver is aware of [facility]'s affiliation and/or partnership with agencies in home health care, LTAC, SNF, [facility] and other hospitals and clinics.    Understanding: Patient and Caregiver reports having a clear understanding of the many lifetime commitments involved with being a transplant recipient, including costs, compliance, medications, lab work,  procedures, appointments, concrete and financial planning, preparedness, timely and appropriate communication of concerns, abstinence (ETOH, tobacco, illicit non-prescribed drugs), adherence to all health care team recommendations, support system and caregiver involvement, appropriate and timely resource utilization and follow-through, mental health counseling as needed/recommended, and patient and caregiver responsibilities.  Social Service Handbook, resources and detailed educational information provided and reviewed.  Educational information provided.    Patient and Caregiver also reports current and expected compliance with health care regime and states having a clear understanding of the importance of compliance.      Patient and Caregiver reports a clear understanding that risks and benefits may be involved with organ transplantation and with organ donation.       Patient and Caregiver also reports clear understanding that psychosocial risk factors may affect patient, and include but are not limited to feelings of depression, generalized anxiety, anxiety regarding dependence on others, post traumatic stress disorder, feelings of guilt and other emotional and/or mental concerns, and/or exacerbation of existing mental health concerns.  Detailed resources provided and discussed.      Patient and Caregiver agrees to access appropriate resources in a timely manner as needed and/or as recommended, and to communicate concerns appropriately.  Patient and Caregiver also reports a clear understanding of treatment options available.     Patient and Caregiver received education in a group setting.   reviewed education, provided additional information, and answered questions.    Feelings or Concerns: Pt states she is concerned about being away from home for an extended period of time.     Coping:   Pt stated she is coping with dialysis and being listed for transplant.  Pt enjoys shopping, playing [employer]elo,  "going to the [facility] and [facility].  Stated she is "just waiting" for transplant.    Goals: Feel better; get an enjoyable part time job.  Get off dialysis. Patient referred to Vocational Rehabilitation.    Interview Behavior: Patient and Caregiver presents as alert and oriented x 4, pleasant, good eye contact, well groomed, recall good, concentration/judgement good, average intelligence, calm, communicative, cooperative, asking and answering questions appropriately.   She was accompanied by her dtr Daria, who appeared to be supportive of pt's pursuit of transplant..          Transplant Social Work - Candidacy  Assessment/Plan:     Psychosocial Suitability: Patient presents as an acceptable candidate for kidney transplant at this time. Based on psychosocial risk factors, patient presents as low risk, due to absence of serious psychosocial risk factors.  Pt was accompanied by  who presented as supportive of pt's pursuit of transplant.     Recommendations/Additional Comments: Fundraising.  Pt would benefit from [address].    Meg Castanon LCSW       "

## 2018-10-01 NOTE — PROGRESS NOTES
Patient's chart reviewed today. Patient due for follow-up in March 2019. Appointments will be scheduled per protocol. Pt is aware that mmg is due & will schedule this month. Pt will call with appt date & time. HLA sample current, in lab, and being received on a regular basis.

## 2018-10-05 DIAGNOSIS — Z76.82 AWAITING ORGAN TRANSPLANT STATUS: Primary | ICD-10-CM

## 2018-10-10 DIAGNOSIS — Z76.82 ORGAN TRANSPLANT CANDIDATE: ICD-10-CM

## 2019-02-19 DIAGNOSIS — Z76.82 ORGAN TRANSPLANT CANDIDATE: Primary | ICD-10-CM

## 2019-03-15 ENCOUNTER — OFFICE VISIT (OUTPATIENT)
Dept: TRANSPLANT | Facility: CLINIC | Age: 69
End: 2019-03-15
Payer: MEDICARE

## 2019-03-15 ENCOUNTER — HOSPITAL ENCOUNTER (OUTPATIENT)
Dept: RADIOLOGY | Facility: HOSPITAL | Age: 69
Discharge: HOME | End: 2019-03-15
Attending: TRANSPLANT SURGERY
Payer: MEDICARE

## 2019-03-15 VITALS
HEART RATE: 111 BPM | HEIGHT: 69 IN | OXYGEN SATURATION: 100 % | TEMPERATURE: 98 F | RESPIRATION RATE: 18 BRPM | BODY MASS INDEX: 36.11 KG/M2 | WEIGHT: 243.81 LBS | SYSTOLIC BLOOD PRESSURE: 160 MMHG | DIASTOLIC BLOOD PRESSURE: 77 MMHG

## 2019-03-15 DIAGNOSIS — N18.6 ESRD ON HEMODIALYSIS: Primary | Chronic | ICD-10-CM

## 2019-03-15 DIAGNOSIS — Z86.19 HISTORY OF STAPH INFECTION: Chronic | ICD-10-CM

## 2019-03-15 DIAGNOSIS — K25.9 GASTRIC ULCER, UNSPECIFIED CHRONICITY, UNSPECIFIED WHETHER GASTRIC ULCER HEMORRHAGE OR PERFORATION PRESENT: ICD-10-CM

## 2019-03-15 DIAGNOSIS — K27.4 PEPTIC ULCER DISEASE WITH HEMORRHAGE: ICD-10-CM

## 2019-03-15 DIAGNOSIS — Z76.82 ORGAN TRANSPLANT CANDIDATE: ICD-10-CM

## 2019-03-15 DIAGNOSIS — E03.9 ACQUIRED HYPOTHYROIDISM: ICD-10-CM

## 2019-03-15 DIAGNOSIS — Z76.82 AWAITING ORGAN TRANSPLANT STATUS: ICD-10-CM

## 2019-03-15 DIAGNOSIS — E11.21 TYPE 2 DIABETES MELLITUS WITH DIABETIC NEPHROPATHY, UNSPECIFIED WHETHER LONG TERM INSULIN USE: Chronic | ICD-10-CM

## 2019-03-15 DIAGNOSIS — Z99.2 ESRD ON HEMODIALYSIS: Primary | Chronic | ICD-10-CM

## 2019-03-15 PROCEDURE — 74176 CT ABDOMEN PELVIS WITHOUT CONTRAST: ICD-10-PCS | Mod: 26,TXP,, | Performed by: RADIOLOGY

## 2019-03-15 PROCEDURE — 74176 CT ABD & PELVIS W/O CONTRAST: CPT | Mod: 26,TXP,, | Performed by: RADIOLOGY

## 2019-03-15 PROCEDURE — 99215 OFFICE O/P EST HI 40 MIN: CPT | Mod: S$PBB,TXP,, | Performed by: INTERNAL MEDICINE

## 2019-03-15 PROCEDURE — 99999 PR PBB SHADOW E&M-EST. PATIENT-LVL IV: ICD-10-PCS | Mod: PBBFAC,TXP,, | Performed by: INTERNAL MEDICINE

## 2019-03-15 PROCEDURE — 99999 PR PBB SHADOW E&M-EST. PATIENT-LVL IV: CPT | Mod: PBBFAC,TXP,, | Performed by: INTERNAL MEDICINE

## 2019-03-15 PROCEDURE — 99214 OFFICE O/P EST MOD 30 MIN: CPT | Mod: PBBFAC,25,TXP | Performed by: INTERNAL MEDICINE

## 2019-03-15 PROCEDURE — 99215 PR OFFICE/OUTPT VISIT, EST, LEVL V, 40-54 MIN: ICD-10-PCS | Mod: S$PBB,TXP,, | Performed by: INTERNAL MEDICINE

## 2019-03-15 PROCEDURE — 74176 CT ABD & PELVIS W/O CONTRAST: CPT | Mod: TC,TXP

## 2019-03-15 NOTE — LETTER
March 15, 2019        Mara Tomas  [address]  [place] [address]  Phone: [phone]  Fax: [phone]             [facility]  [address]  [place] [address]  Phone: [phone]   Patient: Casandra Pack   MR Number: [id number]   YOB: 1950   Date of Visit: 3/15/2019       Dear Dr. Mara Tomas    Thank you for referring Casanrda Pack to me for evaluation. Attached you will find relevant portions of my assessment and plan of care.    If you have questions, please do not hesitate to call me. I look forward to following Casandra Pack along with you.    Sincerely,    Oriana Stout MD    Enclosure    If you would like to receive this communication electronically, please contact [email or web address] or [phone] to request [facility] access.    [facility] is a tool which provides read-only access to select patient information with whom you have a relationship. Its easy to use and provides real time access to review your patients record including encounter summaries, notes, results, and demographic information.    If you feel you have received this communication in error or would no longer like to receive these types of communications, please e-mail [email or web address]

## 2019-03-15 NOTE — PROGRESS NOTES
Kidney Transplant Recipient Reevalulation    Referring Physician: Mara Tomas  Current Nephrologist: Mara Tomas  Waitlist Status: active  Dialysis Start Date: 3/17/2016    Subjective:     CC:  Annual reassessment of kidney transplant candidacy.    HPI:  Ms. Pack is a 68 y.o. year old White female with ESRD secondary to diabetic nephropathy (DM diagnosed at age 28).  She has been on the wait list for a kidney transplant at [facility] since 3/17/2016. Patient is currently on hemodialysis started on 3/17/16. Patient is dialyzing on TTS schedule.  Patient reports that she is tolerating dialysis well. States she will hypotension to the 90s/40s standing after dialysis couple times a month. She has a LUE AV fistula. Patient was last seen in [facility] on 9/14/18. She denies any recent hospitalizations or ED visits.    CXR, pelvic x-ray, and renal US ok from 9/14/18. NM stress test negative from 9/14/18. She had CT A/P today with results still pending. States she is going to have a colonoscopy at the end of the month     She is accompanied to visit by her daughter.    She doesn't really exercise. She lives with her  in a 1 story house without any stairs to climb. She will do bathroom cleaning, grocery shopping, and cooking but her  does the rest of the household chores. Occasionally she will have lower back pain but states she walks around the grocery store. Doesn't use a walker, cane, or scooter.     Review of Systems   Constitutional: +fatigue bradley after dialysis; Denies fever/chills, night sweats  EENT: +vision problems; +wears eye glasses; Denies hearing problems, trouble swallowing.   Respiratory: Denies shortness of breath, dyspnea on exertion, orthopnea, wheezing, hemoptysis, denies known TB exposure or history of positive TB skin test  Cardiovascular: Denies chest pain, palpitations, history of MI, history of stroke, history of DVT  Gastrointestinal: Denies abdominal pain,  "nausea/vomiting/diarrhea/constipation. Denies history of GI bleeding or ulcers.   Genitourinary: +estimates residual UOP as 2 cups/day; +history of recurrent UTIs - estimates 10 lifetime UTIs but states they were occurring more often when she was working and holding her urine for long periods of time at work - retired since 2002; last one was in 2018; Denies history of kidney stones, history of urinary obstruction, gross hematuria, dysuria, urinary frequency, incontinence  Musculoskeletal: +occasional back pain; RA - in remission, was on leflunomide but was taking off leflunomide in Jan 2018 and was started on methotrexate in June/July 2018 but states she was only able to take 2 doses secondary to developing significant strep infection and was taken off of the methotrexate and not placed on any other agent - followed by Dr. Joshi - last seen in 2/4/19; Denies trouble moving extremities, denies joint pain or swelling  Skin: +shingles in Sept 2018; Denies history of skin cancer, denies rash, ulcerations  Heme/onc: Denies any history of cancer, denies history of coagulopathies or bleeding disorders  Endocrine: +thyroid disease; Denies unintentional weight loss/weight gain  Neurological: +occasional tremors - possible related to hypoglycemia; +peripheral neuropathy; +occasional positional lightheadedness; Denies seizures, headaches  Psychiatric: +patient states she is a "worry wort"; Denies anxiety, depression, SI/HI. Denies hallucinations or delusions.    Potential Donors: no     Medications:   Current Outpatient Medications   Medication Sig Dispense Refill    allopurinol (ZYLOPRIM) 100 MG tablet Take 100 mg by mouth 3 (three) times a week.      amlodipine (NORVASC) 5 MG tablet Take 5 mg by mouth 2 (two) times daily.      atorvastatin (LIPITOR) 80 MG tablet Take 80 mg by mouth once daily.      cholecalciferol, vitamin D3, (VITAMIN D3) 2,000 unit Cap Take 1 capsule by mouth once a week.      DIALYVITE 100-1 mg Tab " "TK 1 T PO  QD  11    escitalopram oxalate (LEXAPRO) 5 MG Tab Take 5 mg by mouth once daily.      insulin glargine (LANTUS) 100 unit/mL injection Inject 35 Units into the skin every evening.      insulin lispro (HUMALOG) 100 unit/mL injection Inject 25 Units into the skin 3 (three) times daily before meals.      leflunomide (ARAVA) 20 MG Tab       levothyroxine (SYNTHROID, LEVOTHROID) 175 MCG tablet Take 175 mcg by mouth once daily.      linagliptin (TRADJENTA) 5 mg Tab tablet Take 5 mg by mouth once daily.      pantoprazole (PROTONIX) 40 MG tablet Take 40 mg by mouth once daily.      sevelamer carbonate (RENVELA) 800 mg Tab Take 800 mg by mouth 3 (three) times daily with meals.      torsemide (DEMADEX) 5 MG Tab Take 20 mg by mouth once daily.      traZODone (DESYREL) 50 MG tablet       BREO ELLIPTA 100-25 mcg/dose diskus inhaler        No current facility-administered medications for this visit.      *not taking Arava      Objective:   body mass index is 36.01 kg/m².   BP (!) 160/77 (BP Location: Right arm, Patient Position: Sitting, BP Method: Medium (Automatic))   Pulse (!) 111   Temp 98.1 °F (36.7 °C) (Oral)   Resp 18   Ht 5' 9" (1.753 m)   Wt 110.6 kg (243 lb 13.3 oz)   SpO2 100%   BMI 36.01 kg/m²       Physical Exam   General: No acute distress, well groomed, alert and oriented x 3  HEENT: Normocephalic, atraumatic, PERRLA, sclera anicteric, conjunctiva/corneas clear, EOM's intact bilaterally, external inspection of ears and nose normal, moist mucous membranes, no oral ulcerations/lesions   Neck: Supple, symmetrical, trachea midline, no masses, no carotid bruits, no JVD, thyroid is normal without nodules or enlargement   Respiratory: Clear to auscultation bilaterally, respirations unlabored, no rales/rhonchi/wheezing   Cardiovacular: Regular rate and rhythm, S1, S2 normal, no murmurs, no rubs or gallops   Gastrointestinal: Soft, obese, non-tender, bowel sounds normal, no masses, no palpable " organomegaly  Extremities: No clubbing or cyanosis of upper extremities bilaterally, no pedal edema bilaterally; +2 bilateral radial pulses  Skin: warm and dry; no rash on exposed skin  Lymph nodes: Cervical and supraclavicular nodes normal   Neurologic: No focal neurologic deficits.   Musculoskeletal: moves all extremities without difficulty, FROM, 5/5 strength, ambulates without an assistive device  Psychiatric: Normal mood and affect. Responds appropriately to questions.  Access: LUE AVF +thrill/bruit     Labs:  Lab Results   Component Value Date    WBC 7.67 08/17/2016    HGB 11.6 (L) 08/17/2016    HCT 35.8 (L) 08/17/2016     08/17/2016    K 4.4 08/17/2016     08/17/2016    CO2 22 (L) 08/17/2016    BUN 44 (H) 08/17/2016    CREATININE 4.6 (H) 08/17/2016    EGFRNONAA 9.5 (A) 08/17/2016    CALCIUM 9.1 08/17/2016    PHOS 4.5 08/17/2016    ALBUMIN 3.2 (L) 08/17/2016    AST 27 08/17/2016    ALT 19 08/17/2016    .0 (H) 08/17/2016       No results found for: PREALBUMIN, BILIRUBINUA, GGT, AMYLASE, LIPASE, PROTEINUA, NITRITE, RBCUA, WBCUA    No results found for: HLAABCTYPE    Lab Results   Component Value Date    CPRA 55 01/08/2019    ZV4QRGF B81,B7,B60,B61,B27,B48,B73,B13,B42 01/08/2019    CIABCLM WEAK B67, B47 01/08/2019    CIIAB DR16,DR15 01/08/2019    ABCMT WEAK DR15 10/02/2018       Labs were reviewed with the patient.    Pre-transplant Workup:   Reviewed with the patient.    Assessment:     1. ESRD from DM; since Mar 2016 on hemodialysis    2. Type 2 diabetes mellitus with diabetic nephropathy, unspecified whether long term insulin use    3. History of recurrent skin staph infection    4. Gastric ulcer, unspecified chronicity, unspecified whether gastric ulcer hemorrhage or perforation present    5. Peptic ulcer disease with hemorrhage    6. Organ transplant candidate    7. Acquired hypothyroidism    8. Awaiting organ transplant status        Plan:     Transplant Candidacy:   Ms. Pack is a  suitable kidney transplant candidate.  She remains in overall stable health, and will remain active on the transplant list. Follow-up on CT A/P result from today and review with transplant surgery.     Meets center eligibility for accepting HCV+ donor offer - yes.  Patient educated on HCV+ donors. Casandra Pack is willing to accept HCV+ donor offer - yes.  Patient is a candidate for KDPI > 85 kidney donor offer - no.    Encouraged patient to seek living donors and have them contact the living donor coordinator.     Exercise: reminded Casandra of the importance of regular exercise for weight management, blood sugar and blood pressure management.  I also explained exercise has been shown to improve cardiovascular health, energy level, and sleep hygiene.  Lastly, I advised her that cardiovascular complications are leading cause of death for renal transplant recipients, and regular exercise can help lower this risk.    Advised patient to check standing BPs in light of symptoms she reported in terms of occasional lightheadedness upon standing and to discuss with her general nephrologist regarding BP med adjustment.       Oriana Stout MD       Follow-up:   In addition to the tests noted in the plan, Ms. Pack will continue to have reevaluation as per the standing pre-kidney transplant protocol:  1. Monthly blood for PRA  2. Annual return to clinic, except HIV positive, > 65 years of age, or pancreas transplant candidates who will be scheduled to see transplant every 6 months while in pre-transplant phase  3. Annual re-testing: CXR, EKG, yearly mammograms for women over 40 and PSA for males over 40, cardiology follow-up as recommended by initial cardiology pre-transplant evaluation  4. Renal ultrasound every 2 years  5. Baseline colonoscopy after age 50 and repeated as recommended    UNOS Patient Status  Functional Status: 70% - Cares for self: unable to carry on normal activity or active work  Physical Capacity: No  Limitations

## 2019-03-15 NOTE — PROGRESS NOTES
LISTED PATIENT EDUCATION NOTE    Ms. Casandra Pack was seen in LISTED kidney transplant clinic for evaluation for kidney, kidney/pancreas or pancreas only transplant.  The patient attended a group education session that discussed/reviewed the following aspects of transplantation: evaluation and selection committee process, UNOS waitlist management/multiple listings, types of organs offered (KDPI < 85%, KDPI > 85%, PHS increased risk, DCD, HCV+), financial aspects, surgical procedures, dietary instruction pre- and post-transplant, health maintenance pre- and post-transplant, post-transplant hospitalization and outpatient follow-up, potential to participate in a research protocol, and medication management and side effects.  A question and answer session was provided after the presentation.    The patient was seen by all members of the multi-disciplinary team to include: Nephrologist/PA, Surgeon, , Transplant Coordinator, , Pharmacist and Dietician (if applicable).    The patient reviewed and signed all consents for evaluation which were witnessed and sent to scanning into the EPIC chart.    The patient was given an education book and plan for further evaluation based on her individual assessment.      The patient was encouraged to call with any questions or concerns.

## 2019-03-15 NOTE — PATIENT INSTRUCTIONS
1. Check your blood pressure in the standing position especially when you feel lightheaded - you may have orthostatic hypotension from what you describe  2. Discuss with your kidney doctor whether your blood pressure medications need to be decreased   3. Stay active   4. Try to exercise more   5. Check your blood sugar when you get tremors

## 2019-03-15 NOTE — LETTER
Date: 3/15/2019          Listed Patient      To: Dialysis Unit  and Charge RN From: [facility] Workers and      Kidney Transplant Nurse Coordinators    RE: Casandra Pack, 1950, [id number]     At [facility], we conduct adherence checks as an important part of transplant care. Initial and listed patient assessments are not complete without adherence information.        Please complete the following information:     Current Dry Weight: ___________         Most Recent Pre-Treatment Weight: ___________ /date: _________                    Data from the last 3 months:  (data from last 3 months preferred):    Number of AMAs with dates, time, and reasons: ____________________________________________________    ______________________________________________________________________________________________    ______________________________________________________________________________________________    Number of No-Shows with dates and reasons: ______________________________________________________      ______________________________________________________________________________________________    Last intact PTH:  ___________/date: __________      Any concerns with Labs:  YES / NO      If yes, please explain:  ___________________________________________________________________________    ______________________________________________________________________________________________    Any concerns with Caregivers:  YES / NO    If yes, please explain:  ___________________________________________________________________________    ______________________________________________________________________________________________     Any concerns with Transportation:  YES / NO    If yes, please explain:   ___________________________________________________________________________    ______________________________________________________________________________________________    Any Psychiatric and/or Psychosocial concerns:  YES / NO     If yes, please explain: ___________________________________________________________________________    ______________________________________________________________________________________________      PLEASE RETURN TO: FAX: [phone]     Thank you for collaborating with us in the care of this patient.           [address]  ?  [place] [address]  ?  phone [phone]  ?  fax [phone]  ?  [email or web address]  Confidentiality notice: The accompanying facsimile is intended solely for the use of the recipient designated above. Document(s) transmitted herewith may contain information that is confidential and privileged. Delivery, distribution or dissemination of this communication other than to the intended recipient is strictly prohibited. If you have received this facsimile in error, please notify us immediately by telephone.

## 2019-03-15 NOTE — PROGRESS NOTES
Transplant Recipient Adult Psychosocial Assessment Update    Casandra Pack  [address]   [place] [address]  Telephone Information:   Mobile [phone]   Home  [phone] (home)  Work  There is no work phone number on file.  E-mail  No e-mail address on record    Sex: female  YOB: 1950  Age: 68 y.o.    Encounter Date: 3/15/2019  [place] Citizen: yes  Primary Language: English   Needed: no    Emergency Contact:  Name: Daria Pack  Relationship: daughter  Address: [address] [place] [address]  Phone Numbers:  [phone] (home), n/a (work), [phone] (mobile)    Family/Social Support:   Number of dependents/: pt has a dog which will be cared for by family   Marital history:  once for 44 yrs to current ; two children  Other family dynamics: Pt reports parents ; three  sisters and one brother who is living.   Pt reports supportive children and . Dtr is an RN and son is a NP.    Household Composition:    Pt and her , 68 y/o Rodriguez Pack live in their home together.  Rodriguez drives [phone].  They have a dog.    Do you and your caregivers have access to reliable transportation? yes  PRIMARY CAREGIVER: Daria Pack will be primary caregiver, phone number [phone].      provided in-depth information to patient and caregiver regarding pre- and post-transplant caregiver role.   strongly encourages patient and caregiver to have concrete plan regarding post-transplant care giving, including back-up caregiver(s) to ensure care giving needs are met as needed.    Patient and Caregiver states understanding all aspects of caregiver role/commitment and is able/willing/committed to being caregiver to the fullest extent necessary.    Patient and Caregiver verbalizes understanding of the education provided today and caregiver responsibilities.         remains  available. Patient and Caregiver agree to contact  in a timely manner if concerns arise.      Able to take time off work without financial concerns: yes.     Additional Significant Others who will Assist with Transplant:  Name: Rodriguez [phone]  Age: 67  City: [place] State: [place]  Relationship:   Does person drive? yes    Name: Billy Pack  [phone]  Age: 34  City: [place] State: [place]  Relationship: son  Does person drive? yes    Living Will: no  Healthcare Power of : no  Advance Directives on file: <<no information> per medical record.  Verbally reviewed LW/HCPA information.   provided patient with copy of LW/HCPA documents and provided education on completion of forms.    Living Donors: No. Education and resource information given to patient.    Highest Education Level: Attended College/Technical School  Reading Ability: college  Reports difficulty with: N/A  Learns Best By:  Verbal and written instruction     Status: no  VA Benefits: no     Working for Income: No  If no, reason not working: Patient Choice - Retired  Patient is retired from [employer]..    Spouse/Significant Other Employment: Retired    Disabled: yes: date disability began: 2006, due to: ESRD.    Monthly Income:  Other: $Total household $3600.00  Able to afford all costs now and if transplanted, including medications: yes  Patient and Caregiver verbalizes understanding of personal responsibilities related to transplant costs and the importance of having a financial plan to ensure that patients transplant costs are fully covered.       provided fundraising information/education. Patient and Caregiververbalizes understanding.   remains available.    Insurance:   Payor/Plan Subscr  Sex Relation Sub. Ins. ID Effective Group Num   1. [place]ARE - [place]* TITUS PACK 1950 Female  [id number]                                    [address]   2. [facility]* TITUS MILLAN 1950 Female  [id number] 1/1/17                                    [address]       Primary Insurance (for UNOS reporting): Public Insurance - Medicare FFS (Fee For Service)  Secondary Insurance (for UNOS reporting): Private Insurance  Patient and Caregiver verbalizes clear understanding that patient may experience difficulty obtaining and/or be denied insurance coverage post-surgery. This includes and is not limited to disability insurance, life insurance, health insurance, burial insurance, long term care insurance, and other insurances.      Patient and Caregiver also reports understanding that future health concerns related to or unrelated to transplantation may not be covered by patient's insurance.  Resources and information provided and reviewed.     Patient and Caregiver provides verbal permission to release any necessary information to outside resources for patient care and discharge planning.  Resources and information provided are reviewed.      Dialysis Adherence: Patient reports good compliance.  Pt has a history of good dialysis compliance reports.  Infusion Service: patient utilizing? no  Home Health: patient utilizing? no  DME: no  Pulmonary/Cardiac Rehab: none   ADLS:  Pt states ability to independently accomplish all adls.    Adherence:   Pt states current and expected compliance with all healthcare recommendations..  Adherence education and counseling provided.     Per History Section:  Past Medical History:   Diagnosis Date    Acquired hypothyroidism 8/17/2016    Chronic rheumatic arthritis     Remicade 4905-5430, stopped d/t recurrent infection (skin abscesses)    Diabetes mellitus, type 2 since 1978 (age 28)     ESRD from DM; since Mar 2016 on hemodialysis     Peptic ulcer disease with hemorrhage 2016    Psoriasis      Social History     Tobacco Use    Smoking status: Never Smoker    Smokeless tobacco: Never Used   Substance Use  Topics    Alcohol use: No     Social History     Substance and Sexual Activity   Drug Use No     Social History     Substance and Sexual Activity   Sexual Activity Not Currently    Partners: Male    Birth control/protection: Post-menopausal       Per Today's Psychosocial:  Tobacco: none, patient denies any use.  Alcohol: none, patient denies any use.  Illicit Drugs/Non-prescribed Medications: none, patient denies any use.    Patient and Caregiver states clear understanding of the potential impact of substance use as it relates to transplant candidacy and is aware of possible random substance screening.  Substance abstinence/cessation counseling, education and resources provided and reviewed.     Arrests/DWI/Treatment/Rehab: patient denies    Psychiatric History:    Mental Health: depression  Psychiatrist/Counselor: PCP prescribes antidepressants and ambien.  Medications:  lexapro and trazadone; she states she does not seem to worry about certain things as much.  Suicide/Homicide Issues: Pt denies current or past suicidal/homicidal ideation.   Safety at home: Pt denies any home safety concerns.    Knowledge: Patient and Caregiver states having clear understanding and realistic expectations regarding the potential risks and potential benefits of organ transplantation and organ donation and agrees to discuss with health care team members and support system members, as well as to utilize available resources and express questions and/or concerns in order to further facilitate the pt informed decision-making.  Resources and information provided and reviewed.    Patient and Caregiver is aware of [facility] affiliation and/or partnership with agencies in home health care, LTAC, SNF, [facility], and other hospitals and clinics.    Understanding: Patient and Caregiver reports having a clear understanding of the many lifetime commitments involved with being a transplant recipient, including costs, compliance, medications, lab work,  procedures, appointments, concrete and financial planning, preparedness, timely and appropriate communication of concerns, abstinence (ETOH, tobacco, illicit non-prescribed drugs), adherence to all health care team recommendations, support system and caregiver involvement, appropriate and timely resource utilization and follow-through, mental health counseling as needed/recommended, and patient and caregiver responsibilities.  Social Service Handbook, resources and detailed educational information provided and reviewed.  Educational information provided.    Patient and Caregiver also reports current and expected compliance with health care regime and states having a clear understanding of the importance of compliance.      Patient and Caregiver reports a clear understanding that risks and benefits may be involved with organ transplantation and with organ donation.       Patient and Caregiver also reports clear understanding that psychosocial risk factors may affect patient, and include but are not limited to feelings of depression, generalized anxiety, anxiety regarding dependence on others, post traumatic stress disorder, feelings of guilt and other emotional and/or mental concerns, and/or exacerbation of existing mental health concerns.  Detailed resources provided and discussed.      Patient and Caregiver agrees to access appropriate resources in a timely manner as needed and/or as recommended, and to communicate concerns appropriately.  Patient and Caregiver also reports a clear understanding of treatment options available.     Patient and Caregiver received education in a group setting.   reviewed education, provided additional information, and answered questions.    Feelings or Concerns: Pt states she is concerned about being away from home for an extended period of time.     Coping:   Pt stated she is coping with dialysis and being listed for transplant.  Pt enjoys shopping, playing [employer],  "going to the [facility] and [facility].  Stated she is "just waiting" for transplant.    Goals: Feel better; get an enjoyable part time job.  Get off dialysis. Patient referred to Vocational Rehabilitation.    Interview Behavior: Patient and Caregiver presents as alert and oriented x 4, pleasant, good eye contact, well groomed, recall good, concentration/judgement good, average intelligence, calm, communicative, cooperative, asking and answering questions appropriately.   She was accompanied by her dtr Daria, who appeared to be supportive of pt's pursuit of transplant..          Transplant Social Work - Candidacy  Assessment/Plan:     Psychosocial Suitability: Patient presents as an acceptable candidate for kidney transplant at this time. Based on psychosocial risk factors, patient presents as low risk, due to absence of serious psychosocial risk factors.  Pt was accompanied by  who presented as supportive of pt's pursuit of transplant.     Recommendations/Additional Comments: ALBA recommends that pt conduct fundraising to assist pt with pay for cost of medication, food,gas, and other transplant related expenses. ALBA recommends that pt remain free of all tobacco,ETOH, and substance use. SW remains available to assist with any concerns that may arise as pt navigates through the transplant process.      Ally Banks LMSW       "

## 2019-03-25 NOTE — PROGRESS NOTES
Patient's chart reviewed today. Patient due for follow-up in September 2019. Appointments will be scheduled per protocol. Pt needs updated mmg. Attempted to call pt, no ans, phone just rang & unable to leave msg. LM with pt's spouse for pt to return call. HLA sample current, in lab, and being received on a regular basis.

## 2019-03-25 NOTE — PROGRESS NOTES
Moderate calcifications. Acceptable iliac arteries for transplant    Need repeat CT in 2 years if not transplanted. Complex Repair And Melolabial Flap Text: The defect edges were debeveled with a #15 scalpel blade.  The primary defect was closed partially with a complex linear closure.  Given the location of the remaining defect, shape of the defect and the proximity to free margins a melolabial flap was deemed most appropriate for complete closure of the defect.  Using a sterile surgical marker, an appropriate advancement flap was drawn incorporating the defect and placing the expected incisions within the relaxed skin tension lines where possible.    The area thus outlined was incised deep to adipose tissue with a #15 scalpel blade.  The skin margins were undermined to an appropriate distance in all directions utilizing iris scissors.

## 2019-08-09 DIAGNOSIS — Z76.82 ORGAN TRANSPLANT CANDIDATE: Primary | ICD-10-CM

## 2019-09-27 ENCOUNTER — HOSPITAL ENCOUNTER (OUTPATIENT)
Dept: CARDIOLOGY | Facility: CLINIC | Age: 69
Discharge: HOME | End: 2019-09-27
Attending: NURSE PRACTITIONER
Payer: MEDICARE

## 2019-09-27 ENCOUNTER — HOSPITAL ENCOUNTER (OUTPATIENT)
Dept: RADIOLOGY | Facility: HOSPITAL | Age: 69
Discharge: HOME | End: 2019-09-27
Attending: INTERNAL MEDICINE
Payer: MEDICARE

## 2019-09-27 ENCOUNTER — HOSPITAL ENCOUNTER (OUTPATIENT)
Dept: RADIOLOGY | Facility: HOSPITAL | Age: 69
Discharge: HOME | End: 2019-09-27
Attending: NURSE PRACTITIONER
Payer: MEDICARE

## 2019-09-27 ENCOUNTER — OFFICE VISIT (OUTPATIENT)
Dept: TRANSPLANT | Facility: CLINIC | Age: 69
End: 2019-09-27
Payer: MEDICARE

## 2019-09-27 VITALS
WEIGHT: 243 LBS | DIASTOLIC BLOOD PRESSURE: 70 MMHG | HEART RATE: 80 BPM | HEIGHT: 69 IN | SYSTOLIC BLOOD PRESSURE: 160 MMHG | BODY MASS INDEX: 35.99 KG/M2

## 2019-09-27 VITALS
OXYGEN SATURATION: 98 % | RESPIRATION RATE: 16 BRPM | DIASTOLIC BLOOD PRESSURE: 73 MMHG | HEIGHT: 67 IN | WEIGHT: 240.06 LBS | BODY MASS INDEX: 37.68 KG/M2 | SYSTOLIC BLOOD PRESSURE: 160 MMHG | TEMPERATURE: 98 F | HEART RATE: 91 BPM

## 2019-09-27 DIAGNOSIS — M06.9 RHEUMATOID ARTHRITIS INVOLVING MULTIPLE SITES, UNSPECIFIED RHEUMATOID FACTOR PRESENCE: ICD-10-CM

## 2019-09-27 DIAGNOSIS — Z76.82 ORGAN TRANSPLANT CANDIDATE: ICD-10-CM

## 2019-09-27 DIAGNOSIS — Z76.82 ORGAN TRANSPLANT CANDIDATE: Primary | ICD-10-CM

## 2019-09-27 DIAGNOSIS — L40.9 PSORIASIS: ICD-10-CM

## 2019-09-27 DIAGNOSIS — E11.21 TYPE 2 DIABETES MELLITUS WITH DIABETIC NEPHROPATHY, UNSPECIFIED WHETHER LONG TERM INSULIN USE: Chronic | ICD-10-CM

## 2019-09-27 DIAGNOSIS — N18.6 ESRD ON HEMODIALYSIS: Chronic | ICD-10-CM

## 2019-09-27 DIAGNOSIS — Z99.2 ESRD ON HEMODIALYSIS: Chronic | ICD-10-CM

## 2019-09-27 LAB
ASCENDING AORTA: 3.39 CM
AV INDEX (PROSTH): 0.93
AV MEAN GRADIENT: 3 MMHG
AV PEAK GRADIENT: 6 MMHG
AV VALVE AREA: 3.79 CM2
AV VELOCITY RATIO: 0.86
BSA FOR ECHO PROCEDURE: 2.32 M2
CV ECHO LV RWT: 0.55 CM
DOP CALC AO PEAK VEL: 1.24 M/S
DOP CALC AO VTI: 24.59 CM
DOP CALC LVOT AREA: 4.1 CM2
DOP CALC LVOT DIAMETER: 2.28 CM
DOP CALC LVOT PEAK VEL: 1.07 M/S
DOP CALC LVOT STROKE VOLUME: 93.12 CM3
DOP CALCLVOT PEAK VEL VTI: 22.82 CM
E WAVE DECELERATION TIME: 255.92 MSEC
E/A RATIO: 0.78
E/E' RATIO: 11.57 M/S
ECHO LV POSTERIOR WALL: 1.3 CM (ref 0.6–1.1)
FRACTIONAL SHORTENING: 23 % (ref 28–44)
INTERVENTRICULAR SEPTUM: 1.1 CM (ref 0.6–1.1)
IVRT: 0.07 MSEC
LA MAJOR: 5.38 CM
LA MINOR: 5.43 CM
LA WIDTH: 5.2 CM
LEFT ATRIUM SIZE: 4.62 CM
LEFT ATRIUM VOLUME INDEX: 49.2 ML/M2
LEFT ATRIUM VOLUME: 110.37 CM3
LEFT INTERNAL DIMENSION IN SYSTOLE: 3.63 CM (ref 2.1–4)
LEFT VENTRICLE DIASTOLIC VOLUME INDEX: 46.07 ML/M2
LEFT VENTRICLE DIASTOLIC VOLUME: 103.37 ML
LEFT VENTRICLE MASS INDEX: 95 G/M2
LEFT VENTRICLE SYSTOLIC VOLUME INDEX: 24.7 ML/M2
LEFT VENTRICLE SYSTOLIC VOLUME: 55.4 ML
LEFT VENTRICULAR INTERNAL DIMENSION IN DIASTOLE: 4.72 CM (ref 3.5–6)
LEFT VENTRICULAR MASS: 213.42 G
LV LATERAL E/E' RATIO: 10.13 M/S
LV SEPTAL E/E' RATIO: 13.5 M/S
MV PEAK A VEL: 1.04 M/S
MV PEAK E VEL: 0.81 M/S
PISA TR MAX VEL: 2.53 M/S
PULM VEIN S/D RATIO: 1.41
PV PEAK D VEL: 0.54 M/S
PV PEAK S VEL: 0.76 M/S
RA MAJOR: 4.4 CM
RA PRESSURE: 8 MMHG
RA WIDTH: 4.21 CM
RIGHT VENTRICULAR END-DIASTOLIC DIMENSION: 4.43 CM
RV TISSUE DOPPLER FREE WALL SYSTOLIC VELOCITY 1 (APICAL 4 CHAMBER VIEW): 9.65 CM/S
SINUS: 3.5 CM
STJ: 3.12 CM
TDI LATERAL: 0.08 M/S
TDI SEPTAL: 0.06 M/S
TDI: 0.07 M/S
TR MAX PG: 26 MMHG
TRICUSPID ANNULAR PLANE SYSTOLIC EXCURSION: 1.71 CM
TV REST PULMONARY ARTERY PRESSURE: 34 MMHG

## 2019-09-27 PROCEDURE — 99999 PR PBB SHADOW E&M-EST. PATIENT-LVL V: ICD-10-PCS | Mod: PBBFAC,TXP,, | Performed by: NURSE PRACTITIONER

## 2019-09-27 PROCEDURE — 99215 OFFICE O/P EST HI 40 MIN: CPT | Mod: S$PBB,TXP,, | Performed by: NURSE PRACTITIONER

## 2019-09-27 PROCEDURE — 99215 OFFICE O/P EST HI 40 MIN: CPT | Mod: PBBFAC,25,TXP | Performed by: NURSE PRACTITIONER

## 2019-09-27 PROCEDURE — 71046 XR CHEST PA AND LATERAL: ICD-10-PCS | Mod: 26,TXP,, | Performed by: RADIOLOGY

## 2019-09-27 PROCEDURE — 72170 X-RAY EXAM OF PELVIS: CPT | Mod: TC,TXP

## 2019-09-27 PROCEDURE — 71046 X-RAY EXAM CHEST 2 VIEWS: CPT | Mod: 26,TXP,, | Performed by: RADIOLOGY

## 2019-09-27 PROCEDURE — 99215 PR OFFICE/OUTPT VISIT, EST, LEVL V, 40-54 MIN: ICD-10-PCS | Mod: S$PBB,TXP,, | Performed by: NURSE PRACTITIONER

## 2019-09-27 PROCEDURE — 93306 TRANSTHORACIC ECHO (TTE) COMPLETE (CUPID ONLY): ICD-10-PCS | Mod: 26,S$PBB,TXP, | Performed by: INTERNAL MEDICINE

## 2019-09-27 PROCEDURE — 72170 X-RAY EXAM OF PELVIS: CPT | Mod: 26,TXP,, | Performed by: RADIOLOGY

## 2019-09-27 PROCEDURE — 71046 X-RAY EXAM CHEST 2 VIEWS: CPT | Mod: TC,TXP

## 2019-09-27 PROCEDURE — 99999 PR PBB SHADOW E&M-EST. PATIENT-LVL V: CPT | Mod: PBBFAC,TXP,, | Performed by: NURSE PRACTITIONER

## 2019-09-27 PROCEDURE — 72170 XR PELVIS ROUTINE AP: ICD-10-PCS | Mod: 26,TXP,, | Performed by: RADIOLOGY

## 2019-09-27 PROCEDURE — 93306 TTE W/DOPPLER COMPLETE: CPT | Mod: PBBFAC,TXP | Performed by: INTERNAL MEDICINE

## 2019-09-27 RX ORDER — NYSTATIN 100000 U/G
CREAM TOPICAL
Refills: 0 | COMMUNITY
Start: 2019-09-19 | End: 2020-08-07

## 2019-09-27 RX ORDER — FENOFIBRATE 160 MG/1
1 TABLET ORAL DAILY
Refills: 3 | COMMUNITY
Start: 2019-09-09 | End: 2020-09-21

## 2019-09-27 NOTE — LETTER
September 30, 2019        Mara Tomas  [address]  [place] [address]  Phone: [phone]  Fax: [phone]             [facility] Trans[facility]  [address]  [place] [address]  Phone: [phone]   Patient: Casandra Pack   MR Number: [id number]   YOB: 1950   Date of Visit: 9/27/2019       Dear Dr. Mara Tomas    Thank you for referring Casandra Pack to me for evaluation. Attached you will find relevant portions of my assessment and plan of care.    If you have questions, please do not hesitate to call me. I look forward to following Casandra Pack along with you.    Sincerely,    Kathryn Leal, NP    Enclosure    If you would like to receive this communication electronically, please contact [email or web address] or [phone] to request [employer] ([employer] Link access.    [employer] Link is a tool which provides read-only access to select patient information with whom you have a relationship. Its easy to use and provides real time access to review your patients record including encounter summaries, notes, results, and demographic information.    If you feel you have received this communication in error or would no longer like to receive these types of communications, please e-mail [email or web address]

## 2019-09-27 NOTE — PROGRESS NOTES
Kidney Transplant Recipient Reevalulation    Referring Physician: Mara Tomas  Current Nephrologist: Mara Tomas  Waitlist Status: active  Dialysis Start Date: 3/17/2016    Subjective:     CC:  Six month reassessment of kidney transplant candidacy.    HPI:  Ms. Pack is a 68 y.o. year old White female with ESRD secondary to diabetic nephropathy.  She has been on the wait list for a kidney transplant at [facility] since 3/17/2016. Patient is currently on hemodialysis started on 3/17/2016. Patient is dialyzing on TTS schedule.  Patient reports that she is tolerating dialysis well.. She has a LUE AV fistula. Dialyzes for 4 hours per session.Patient denies any recent hospitalizations or ED visits.    Psoriasis--stable, no problem in years  RA in remission, no meds, f/u with rheumatology in [place]  Does not exercise but is able to do housework and errands without problems, not frail.       9/27/2019 PXR   FINDINGS:  Mild DJD.  No fracture or bone destruction identified.  Vascular calcification noted in the pelvis and the proximal thighs.      Impression     See above       9/27/2019 CXR  FINDINGS:  Heart size normal.  The lungs are clear.  No pleural effusion.      Impression     See above     9/27/2019  TRANSTHORACIC ECHO (TTE) COMPLETE   Conclusion     · Normal left ventricular systolic function. The estimated ejection fraction is 65%  · Significant Concentric left ventricular remodeling.  · Moderate left atrial enlargement.  · Mild right ventricular enlargement.  · Low normal right ventricular systolic function.  · Grade I (mild) left ventricular diastolic dysfunction consistent with impaired relaxation.  · Mild mitral sclerosis.  · Mild tricuspid regurgitation.  · The estimated PA systolic pressure is 34 mm Hg  · Intermediate central venous pressure (8 mm Hg).          Past Medical History:   Diagnosis Date    Acquired hypothyroidism 8/17/2016    Chronic rheumatic arthritis     Remicade  8667-3975, stopped d/t recurrent infection (skin abscesses)    Diabetes mellitus, type 2 since 1978 (age 28)     ESRD from DM; since Mar 2016 on hemodialysis     Peptic ulcer disease with hemorrhage 2016    Psoriasis        Review of Systems   Constitutional: Negative for activity change, appetite change, chills, fatigue, fever and unexpected weight change.   HENT: Negative for congestion, facial swelling, postnasal drip, rhinorrhea, sinus pressure, sore throat and trouble swallowing.         Post nasal drip   Eyes: Positive for visual disturbance. Negative for pain and redness.        Wears glasses   Respiratory: Positive for cough. Negative for chest tightness, shortness of breath and wheezing.    Cardiovascular: Negative.  Negative for chest pain, palpitations and leg swelling.   Gastrointestinal: Negative for abdominal pain, diarrhea, nausea and vomiting.   Genitourinary: Negative for dysuria, flank pain and urgency.        Still makes a good amount of urine    Musculoskeletal: Negative for gait problem, neck pain and neck stiffness.   Skin: Negative for rash.   Allergic/Immunologic: Negative for environmental allergies, food allergies and immunocompromised state.   Neurological: Negative for dizziness, weakness, light-headedness and headaches.   Psychiatric/Behavioral: Negative for agitation and confusion. The patient is not nervous/anxious.        Objective:   body mass index is 37.42 kg/m².    Physical Exam   Constitutional: She is oriented to person, place, and time. She appears well-developed and well-nourished.   HENT:   Head: Normocephalic.   Mouth/Throat: Oropharynx is clear and moist. No oropharyngeal exudate.   Eyes: Pupils are equal, round, and reactive to light. Conjunctivae and EOM are normal. No scleral icterus.   Neck: Normal range of motion. Neck supple.   Cardiovascular: Normal rate, regular rhythm and normal heart sounds.   Pulmonary/Chest: Effort normal and breath sounds normal.    Abdominal: Soft. Normal appearance and bowel sounds are normal. She exhibits no distension and no mass. There is no splenomegaly or hepatomegaly. There is no tenderness. There is no rebound, no guarding, no CVA tenderness, no tenderness at McBurney's point and negative Cao's sign.   Musculoskeletal: Normal range of motion. She exhibits no edema.   Lymphadenopathy:     She has no cervical adenopathy.   Neurological: She is alert and oriented to person, place, and time. She exhibits normal muscle tone. Coordination normal.   Skin: Skin is warm and dry.   Psychiatric: She has a normal mood and affect. Her behavior is normal.   Vitals reviewed.      Labs:  Lab Results   Component Value Date    WBC 7.67 08/17/2016    HGB 11.6 (L) 08/17/2016    HCT 35.8 (L) 08/17/2016     08/17/2016    K 4.4 08/17/2016     08/17/2016    CO2 22 (L) 08/17/2016    BUN 44 (H) 08/17/2016    CREATININE 4.6 (H) 08/17/2016    EGFRNONAA 9.5 (A) 08/17/2016    CALCIUM 9.1 08/17/2016    PHOS 4.5 08/17/2016    ALBUMIN 3.2 (L) 08/17/2016    AST 27 08/17/2016    ALT 19 08/17/2016    .0 (H) 03/15/2019       No results found for: PREALBUMIN, BILIRUBINUA, GGT, AMYLASE, LIPASE, PROTEINUA, NITRITE, RBCUA, WBCUA    No results found for: HLAABCTYPE    Lab Results   Component Value Date    CPRA 38 09/03/2019    DR5UHUQ B81,B7 09/03/2019    CIABCLM WEAK B61, B60 09/03/2019    CIIAB DR16,DR15 09/03/2019    ABCMT WEAK DR15 10/02/2018       Labs were reviewed with the patient.    Pre-transplant Workup:   Reviewed with the patient.    Assessment:     1. Organ transplant candidate    2. ESRD from DM; since Mar 2016 on hemodialysis    3. Type 2 diabetes mellitus with diabetic nephropathy, unspecified whether long term insulin use    4. BMI 37.0-37.9, adult    5. Psoriasis    6. Rheumatoid arthritis involving multiple sites, unspecified rheumatoid factor presence        Plan:       [facility] 10/2019    Transplant Candidacy:   Ms. Pack is a  suitable kidney transplant candidate.  Meets center eligibility for accepting HCV+ donor offer - yes.  Patient educated on HCV+ donors. Casandra is willing  to accept HCV+ donor offer -  yes   Patient is a candidate for KDPI > 85 kidney donor offer - no d/t weight.  She remains in overall stable health, and will remain active on the transplant list.    Kathryn Leal NP       Follow-up:   In addition to the tests noted in the plan, Ms. Pack will continue to have reevaluation as per the standing pre-kidney transplant protocol:  1. Monthly blood for PRA  2. Annual return to clinic, except HIV positive, > 65 years of age, or pancreas transplant candidates who will be scheduled to see transplant every 6 months while in pre-transplant phase  3. Annual re-testing: CXR, EKG, yearly mammograms for women over 40 and PSA for males over 40, cardiology follow-up as recommended by initial cardiology pre-transplant evaluation  4. Renal ultrasound every 2 years  5. Baseline colonoscopy after age 50 and repeated as recommended    UNOS Patient Status  Functional Status: 60% - Requires occasional assistance but is able to care for needs  Physical Capacity: No Limitations

## 2019-09-27 NOTE — PROGRESS NOTES
PHARM.D. PRE-TRANSPLANT NOTE:    This patient's medication therapy was evaluated as part of her pre-transplant evaluation.      The following general pharmacologic concerns were noted: restart lexapro, levothyroxine, trazodone to prevent withdrawal post op    The following pharmacologic concerns related to HCV therapy were noted: Atorvastatin interacts with DAA therapy therefore should be transitioned to rosuvastatin prior to initiating (max: 10 mg)      This patient's medication profile was reviewed for contraindications for DAA Hepatitis C therapy:    [x]  No current inducers of CYP 3A4 or PGP  [x]  No amiodarone on this patient's EMR profile in the last 24 months  [x]  No past or current atrial fibrillation on this patient's EMR profile       Current Outpatient Medications   Medication Sig Dispense Refill    allopurinol (ZYLOPRIM) 100 MG tablet Take 100 mg by mouth 3 (three) times a week.      amlodipine (NORVASC) 5 MG tablet Take 5 mg by mouth 2 (two) times daily.      atorvastatin (LIPITOR) 80 MG tablet Take 80 mg by mouth once daily.      BREO ELLIPTA 100-25 mcg/dose diskus inhaler       cholecalciferol, vitamin D3, (VITAMIN D3) 2,000 unit Cap Take 1 capsule by mouth once a week.      DIALYVITE 100-1 mg Tab TK 1 T PO  QD  11    escitalopram oxalate (LEXAPRO) 5 MG Tab Take 5 mg by mouth once daily.      insulin glargine (LANTUS) 100 unit/mL injection Inject 35 Units into the skin every evening.      insulin lispro (HUMALOG) 100 unit/mL injection Inject 25 Units into the skin 3 (three) times daily before meals.      levothyroxine (SYNTHROID, LEVOTHROID) 175 MCG tablet Take 175 mcg by mouth once daily.      linagliptin (TRADJENTA) 5 mg Tab tablet Take 5 mg by mouth once daily.      pantoprazole (PROTONIX) 40 MG tablet Take 40 mg by mouth once daily.      sevelamer carbonate (RENVELA) 800 mg Tab Take 800 mg by mouth 3 (three) times daily with meals.      torsemide (DEMADEX) 5 MG Tab Take 20 mg by  mouth once daily.      traZODone (DESYREL) 50 MG tablet        No current facility-administered medications for this visit.          Currently Ms. Pack is responsible for preparing / administering this patient's medications on a daily basis.  I am available for consultation and can be contacted, as needed by the other members of the Kidney Transplant team.

## 2019-09-27 NOTE — PROGRESS NOTES
Transplant Recipient Adult Psychosocial Assessment Update    Casandra Pack  [address]   [place] [address]  Telephone Information:   Mobile [phone]   Home  [phone] (home)  Work  There is no work phone number on file.  E-mail  No e-mail address on record    Sex: female  YOB: 1950  Age: 68 y.o.    Encounter Date: 2019  [place] Citizen: yes  Primary Language: English   Needed: no    Emergency Contact:  Name: Daria Pack  Relationship: daughter  Address: [address] [place] [address]  Phone Numbers:  [phone] (home), n/a (work), [phone] (mobile)    Family/Social Support:   Number of dependents/: pt has a dog which will be cared for by family   Marital history:  once for 44 yrs to current ; two children  Other family dynamics: Pt reports parents ; three  sisters and one brother who is living.   Pt reports supportive children and . Dtr is an RN and son is a NP.    Household Composition:    Pt and her , 68 y/o Rodriguez Pack live in their home together.  Rodriguez drives [phone].  They have a dog.    Do you and your caregivers have access to reliable transportation? yes  PRIMARY CAREGIVER: Daria Pack will be primary caregiver, phone number [phone].      provided in-depth information to patient and caregiver regarding pre- and post-transplant caregiver role.   strongly encourages patient and caregiver to have concrete plan regarding post-transplant care giving, including back-up caregiver(s) to ensure care giving needs are met as needed.    Patient and Caregiver states understanding all aspects of caregiver role/commitment and is able/willing/committed to being caregiver to the fullest extent necessary.    Patient and Caregiver verbalizes understanding of the education provided today and caregiver responsibilities.         remains  available. Patient and Caregiver agree to contact  in a timely manner if concerns arise.      Able to take time off work without financial concerns: yes.     Additional Significant Others who will Assist with Transplant:  Name: Rodriguez [phone]  Age: 69  City: [place] State: [place]  Relationship:   Does person drive? yes    Name: Billy Pack  [phone]  Age: 34  City: [place] State: [place]  Relationship: son  Does person drive? yes    Living Will: no  Healthcare Power of : no  Advance Directives on file: <<no information> per medical record.  Verbally reviewed LW/HCPA information.   provided patient with copy of LW/HCPA documents and provided education on completion of forms.    Living Donors: No. Education and resource information given to patient.    Highest Education Level: Attended College/Technical School  Reading Ability: college  Reports difficulty with: N/A  Learns Best By:  Verbal and written instruction     Status: no  VA Benefits: no     Working for Income: No  If no, reason not working: Patient Choice - Retired  Patient is retired from [employer]..    Spouse/Significant Other Employment: Retired    Disabled: yes: date disability began: 2006, due to: ESRD.    Monthly Income:  Other: $Total household $3600.00  Able to afford all costs now and if transplanted, including medications: yes  Patient and Caregiver verbalizes understanding of personal responsibilities related to transplant costs and the importance of having a financial plan to ensure that patients transplant costs are fully covered.       provided fundraising information/education. Patient and Caregiververbalizes understanding.   remains available.    Insurance:   Payor/Plan Subscr  Sex Relation Sub. Ins. ID Effective Group Num   1. [place]* TITUS PACK 1950 Female  [id number]                                    [address]   2. [facility]* TITUS MILLAN 1950 Female  [id number] 1/1/17                                    [address]       Primary Insurance (for UNOS reporting): Public Insurance - Medicare FFS (Fee For Service)  Secondary Insurance (for UNOS reporting): Private Insurance  Patient and Caregiver verbalizes clear understanding that patient may experience difficulty obtaining and/or be denied insurance coverage post-surgery. This includes and is not limited to disability insurance, life insurance, health insurance, burial insurance, long term care insurance, and other insurances.      Patient and Caregiver also reports understanding that future health concerns related to or unrelated to transplantation may not be covered by patient's insurance.  Resources and information provided and reviewed.     Patient and Caregiver provides verbal permission to release any necessary information to outside resources for patient care and discharge planning.  Resources and information provided are reviewed.      Dialysis Adherence: Patient reports good compliance.  Pt has a history of good dialysis compliance reports. SW received compliance report via fax and pt has had 0AMAs, 0 no-shows and does not sign off early. No concerns with labs, caregivers, or transportation.    Infusion Service: patient utilizing? no  Home Health: patient utilizing? no  DME: no  Pulmonary/Cardiac Rehab: none   ADLS:  Pt states ability to independently accomplish all adls.    Adherence:   Pt states current and expected compliance with all healthcare recommendations..  Adherence education and counseling provided.     Per History Section:  Past Medical History:   Diagnosis Date    Acquired hypothyroidism 8/17/2016    Chronic rheumatic arthritis     Remicade 9502-4680, stopped d/t recurrent infection (skin abscesses)    Diabetes mellitus, type 2 since 1978 (age 28)     ESRD from DM; since Mar 2016 on hemodialysis     Peptic ulcer disease  with hemorrhage 2016    Psoriasis      Social History     Tobacco Use    Smoking status: Never Smoker    Smokeless tobacco: Never Used   Substance Use Topics    Alcohol use: No     Social History     Substance and Sexual Activity   Drug Use No     Social History     Substance and Sexual Activity   Sexual Activity Not Currently    Partners: Male    Birth control/protection: Post-menopausal       Per Today's Psychosocial:  Tobacco: none, patient denies any use.  Alcohol: none, patient denies any use.  Illicit Drugs/Non-prescribed Medications: none, patient denies any use.    Patient and Caregiver states clear understanding of the potential impact of substance use as it relates to transplant candidacy and is aware of possible random substance screening.  Substance abstinence/cessation counseling, education and resources provided and reviewed.     Arrests/DWI/Treatment/Rehab: patient denies    Psychiatric History:    Mental Health: depression  Psychiatrist/Counselor: PCP prescribes antidepressants and ambien.  Medications:  lexapro and trazadone; she states she does not seem to worry about certain things as much.  Suicide/Homicide Issues: Pt denies current or past suicidal/homicidal ideation.   Safety at home: Pt denies any home safety concerns.    Knowledge: Patient and Caregiver states having clear understanding and realistic expectations regarding the potential risks and potential benefits of organ transplantation and organ donation and agrees to discuss with health care team members and support system members, as well as to utilize available resources and express questions and/or concerns in order to further facilitate the pt informed decision-making.  Resources and information provided and reviewed.    Patient and Caregiver is aware of [facility]'s affiliation and/or partnership with agencies in home health care, LTAC, SNF, [facility], and other hospitals and clinics.    Understanding: Patient and Caregiver reports  having a clear understanding of the many lifetime commitments involved with being a transplant recipient, including costs, compliance, medications, lab work, procedures, appointments, concrete and financial planning, preparedness, timely and appropriate communication of concerns, abstinence (ETOH, tobacco, illicit non-prescribed drugs), adherence to all health care team recommendations, support system and caregiver involvement, appropriate and timely resource utilization and follow-through, mental health counseling as needed/recommended, and patient and caregiver responsibilities.  Social Service Handbook, resources and detailed educational information provided and reviewed.  Educational information provided.    Patient and Caregiver also reports current and expected compliance with health care regime and states having a clear understanding of the importance of compliance.      Patient and Caregiver reports a clear understanding that risks and benefits may be involved with organ transplantation and with organ donation.       Patient and Caregiver also reports clear understanding that psychosocial risk factors may affect patient, and include but are not limited to feelings of depression, generalized anxiety, anxiety regarding dependence on others, post traumatic stress disorder, feelings of guilt and other emotional and/or mental concerns, and/or exacerbation of existing mental health concerns.  Detailed resources provided and discussed.      Patient and Caregiver agrees to access appropriate resources in a timely manner as needed and/or as recommended, and to communicate concerns appropriately.  Patient and Caregiver also reports a clear understanding of treatment options available.     Patient and Caregiver received education in a group setting.   reviewed education, provided additional information, and answered questions.    Feelings or Concerns: Pt states she is concerned about being away from home  "for an extended period of time.     Coping:   Pt stated she is coping with dialysis and being listed for transplant.  Pt enjoys shopping, playing poAcceloWeb, going to the [employer] and crocheting.  Stated she is "just waiting" for transplant.    Goals: Feel better; get an enjoyable part time job.  Get off dialysis. Patient referred to Vocational Rehabilitation.    Interview Behavior: Patient and Caregiver presents as alert and oriented x 4, pleasant, good eye contact, well groomed, recall good, concentration/judgement good, average intelligence, calm, communicative, cooperative, asking and answering questions appropriately.   She was accompanied by her dtr Daria, who appeared to be supportive of pt's pursuit of transplant..          Transplant Social Work - Candidacy  Assessment/Plan:     Psychosocial Suitability: Patient presents as an acceptable candidate for kidney transplant at this time. Based on psychosocial risk factors, patient presents as low risk, due to absence of serious psychosocial risk factors.  Pt was accompanied by  who presented as supportive of pt's pursuit of transplant.     Recommendations/Additional Comments: ALBA recommends that pt conduct fundraising to assist pt with pay for cost of medication, food,gas, and other transplant related expenses. SW recommends that pt remain free of all tobacco,ETOH, and substance use. SW remains available to assist with any concerns that may arise as pt navigates through the transplant process.      Ally Banks LMSW       "

## 2019-10-02 NOTE — PROGRESS NOTES
Patient's chart reviewed today. Patient due for follow-up in March 2020. Appointments will be scheduled per protocol. HLA sample current, in lab, and being received on a regular basis.

## 2019-10-12 ENCOUNTER — HOSPITAL ENCOUNTER (OUTPATIENT)
Dept: TELEMEDICINE | Facility: HOSPITAL | Age: 69
Discharge: HOME | End: 2019-10-12
Payer: MEDICARE

## 2019-10-12 PROCEDURE — G0425 INPT/ED TELECONSULT30: HCPCS | Mod: GT,NTX,, | Performed by: PSYCHIATRY & NEUROLOGY

## 2019-10-12 PROCEDURE — G0425 PR INPT TELEHEALTH CONSULT 30M: ICD-10-PCS | Mod: GT,NTX,, | Performed by: PSYCHIATRY & NEUROLOGY

## 2019-10-12 NOTE — CONSULTS
[facility]  Vascular Neurology  Comprehensive Stroke Center  Tele-Consultation Note      Consults    Consulting Provider: JESSICA MATHEW  Current Providers  No providers found    Patient Location: [facility] TELEMEDICINE ED [facility] Emergency Department  Spoke hospital nurse at bedside with patient assisting consultant.     Patient information was obtained from patient.         Assessment/Plan:   70 y/o with HTN, DM, ESRD on HD, obesity, peptic ulcer disease, presents with acute onset R lip and R arm numbness that developed while getting HD earlier today.  NIHSS 0, CTH without acute abnormality.  Patient has subjective sensory impairment only, no a candidate for iv alteplase.  Recommend checking serologies to exclude medical cause, but if blood work negative then non emergent MRI brain will be prudent to exclude a small L thalamic infarct.   ASA. Neurology consult if MRI confirms stroke.        STROKE DOCUMENTATION     Acute Stroke Times:   Acute Stroke Times   Last Known Normal Date: 10/12/19  Last Known Normal Time: 1230  Symptom Onset Date: 10/12/19  Symptom Onset Time: 1230  Stroke Team Called Date: 10/12/19  Stroke Team Called Time: 1621  Stroke Team Arrival Date: 10/12/19  Stroke Team Arrival Time: 1628  CT Interpretation Time: 1628  Decision to Treat Time for Alteplase: (No iv alteplase)  Decision to Treat Time for IR: (No IR candidate)    NIH Scale:  Interval: baseline  1a. Level of Consciousness: 0-->Alert, keenly responsive  1b. LOC Questions: 0-->Answers both questions correctly  1c. LOC Commands: 0-->Performs both tasks correctly  2. Best Gaze: 0-->Normal  3. Visual: 0-->No visual loss  4. Facial Palsy: 0-->Normal symmetrical movements  5a. Motor Arm, Left: 0-->No drift, limb holds 90 (or 45) degrees for full 10 secs  5b. Motor Arm, Right: 0-->No drift, limb holds 90 (or 45) degrees for full 10 secs  6a. Motor Leg, Left: 0-->No drift, leg  holds 30 degree position for full 5 secs  6b. Motor Leg, Right: 0-->No drift, leg holds 30 degree position for full 5 secs  7. Limb Ataxia: 0-->Absent  8. Sensory: 0-->Normal, no sensory loss  9. Best Language: 0-->No aphasia, normal  10. Dysarthria: 0-->Normal  11. Extinction and Inattention (formerly Neglect): 0-->No abnormality  Total (NIH Stroke Scale): 0     Modified Vinicio Score: 0  Omega Coma Scale:15   ABCD2 Score:    [id number] Score:   HAS -BLED Score:   ICH Score:   Hunt & Evans Classification:       Diagnoses: R arm and face numbness.  No new Assessment & Plan notes have been filed under this hospital service since the last note was generated.  Service: Vascular Neurology      There were no vitals taken for this visit.  Alteplase Eligible?: No  Alteplase Recommendation: Alteplase not recommended due to nihss 0  Possible Interventional Revascularization Candidate? No; No significant neurological deficit    Disposition Recommendation: do not transfer    Subjective:     History of Present Illness: 68 y/o with HTN, DM, ESRD on HD, obesity, peptic ulcer disease, presents with acute onset R lip and R arm numbness that developed while getting HD earlier today. Never had similar symptoms before.  Denies other associated symptoms.  No notes on file      Woke up with symptoms?: no    Recent bleeding noted: no  Does the patient take any Blood Thinners? no  Medications: Antiplatelets:  aspirin      Past Medical History: hypertension, diabetes, kidney problems/dialysis and peptic ulcer disease, obesity    Past Surgical History: no major surgeries within the last 2 weeks    Family History: no relevant history    Social History: no smoking, no drinking, no drugs    Allergies: Levaquin [Levofloxacin] No known drug allergies    Review of Systems   Constitutional: Negative for chills, diaphoresis and fever.   HENT: Negative for hearing loss, tinnitus and trouble swallowing.    Eyes: Negative for visual disturbance.    Respiratory: Negative for shortness of breath.    Cardiovascular: Negative for chest pain and palpitations.   Gastrointestinal: Negative for vomiting.   Endocrine: Negative for cold intolerance.   Genitourinary: Negative for hematuria.   Musculoskeletal: Negative for neck pain and neck stiffness.   Allergic/Immunologic: Negative for immunocompromised state.   Neurological: Positive for numbness. Negative for dizziness, facial asymmetry, speech difficulty, weakness and headaches.   Hematological: Does not bruise/bleed easily.   Psychiatric/Behavioral: Negative for agitation, behavioral problems and confusion.     Objective:   Vitals: There were no vitals taken for this visit. BP: 112/55, Respiratory Rate: 17 and Heart Rate: 78    CT READ: Yes  No hemmorhage. No mass effect. No early infarct signs.     Physical Exam   Constitutional: She appears well-developed and well-nourished.   HENT:   Head: Normocephalic and atraumatic.   Eyes: Pupils are equal, round, and reactive to light. EOM are normal.   Neck: Normal range of motion. Neck supple.   Cardiovascular: Normal rate and regular rhythm.   Pulmonary/Chest: No respiratory distress.   Abdominal: She exhibits no mass.   Obese     Genitourinary:   Genitourinary Comments: No performed   Musculoskeletal: Normal range of motion. She exhibits no deformity.   Skin: No rash noted. She is not diaphoretic. No erythema.   Psychiatric: She has a normal mood and affect. Her behavior is normal.   Nursing note and vitals reviewed.            Recommended the emergency room physician to have a brief discussion with the patient and/or family if available regarding the risks and benefits of treatment, and to briefly document the occurrence of that discussion in his clinical encounter note.     The attending portion of this evaluation, treatment, and documentation was performed per Meir Dodd MD via audiovisual.    Billing code:  (non-intervention mild to moderate stroke,  TIA, some mimics)    · This patient has a critical neurological condition/illness, with some potential for high morbidity and mortality.  · There is a moderate probability for acute neurological change leading to clinical and possibly life-threatening deterioration requiring highest level of physician preparedness for urgent intervention.  · Care was coordinated with other physicians involved in the patient's care.  · Radiologic studies and laboratory data were reviewed and interpreted, and plan of care was re-assessed based on the results.  · Diagnosis, treatment options and prognosis may have been discussed with the patient and/or family members or caregiver.      In your opinion, this was a: Tier 1 Van Negative    Consult End Time: 440 pm      Meir Dodd MD  Comprehensive Stroke Center  Vascular Neurology   [facility]

## 2019-10-12 NOTE — SUBJECTIVE & OBJECTIVE
Woke up with symptoms?: no    Recent bleeding noted: no  Does the patient take any Blood Thinners? no  Medications: Antiplatelets:  aspirin      Past Medical History: hypertension, diabetes, kidney problems/dialysis and peptic ulcer disease, obesity    Past Surgical History: no major surgeries within the last 2 weeks    Family History: no relevant history    Social History: no smoking, no drinking, no drugs    Allergies: Levaquin [Levofloxacin] No known drug allergies    Review of Systems   Constitutional: Negative for chills, diaphoresis and fever.   HENT: Negative for hearing loss, tinnitus and trouble swallowing.    Eyes: Negative for visual disturbance.   Respiratory: Negative for shortness of breath.    Cardiovascular: Negative for chest pain and palpitations.   Gastrointestinal: Negative for vomiting.   Endocrine: Negative for cold intolerance.   Genitourinary: Negative for hematuria.   Musculoskeletal: Negative for neck pain and neck stiffness.   Allergic/Immunologic: Negative for immunocompromised state.   Neurological: Positive for numbness. Negative for dizziness, facial asymmetry, speech difficulty, weakness and headaches.   Hematological: Does not bruise/bleed easily.   Psychiatric/Behavioral: Negative for agitation, behavioral problems and confusion.     Objective:   Vitals: There were no vitals taken for this visit. BP: 112/55, Respiratory Rate: 17 and Heart Rate: 78    CT READ: Yes  No hemmorhage. No mass effect. No early infarct signs.     Physical Exam   Constitutional: She appears well-developed and well-nourished.   HENT:   Head: Normocephalic and atraumatic.   Eyes: Pupils are equal, round, and reactive to light. EOM are normal.   Neck: Normal range of motion. Neck supple.   Cardiovascular: Normal rate and regular rhythm.   Pulmonary/Chest: No respiratory distress.   Abdominal: She exhibits no mass.   Obese     Genitourinary:   Genitourinary Comments: No performed   Musculoskeletal: Normal  range of motion. She exhibits no deformity.   Skin: No rash noted. She is not diaphoretic. No erythema.   Psychiatric: She has a normal mood and affect. Her behavior is normal.   Nursing note and vitals reviewed.

## 2020-01-14 DIAGNOSIS — Z76.82 ORGAN TRANSPLANT CANDIDATE: Primary | ICD-10-CM

## 2020-01-15 ENCOUNTER — TELEPHONE (OUTPATIENT)
Dept: TRANSPLANT | Facility: CLINIC | Age: 70
End: 2020-01-15

## 2020-01-15 NOTE — LETTER
January 15, 2020    Casandra Pack  [address]  [place] [address]    Dear Casandra Pack:  MRN: [id number]    The [facility] team reviewed your transplant candidacy.  It is our programs opinion that you are temporarily not a transplant candidate at our facility as of 1/15/20 because of current hospitalization with diagnosis of ulcers in the mouth.  You will remain listed on the wait list, but will not be able to receive a transplant until this issue is resolved.      Attached is a letter from the United Network for Organ Sharing (UNOS).  It describes the services and information offered to patients by UNOS and the Organ Procurement and Transplant Network.    The [facility] team remains available to answer any questions.  Should you have any questions regarding this decision, please do not hesitate to contact our pre-transplant office.      Sincerely,      Kemi Crowe MD  Medical Director, Kidney & Kidney/Pancreas Transplantation    tj/Enclosure    Cc: Mara Tomas MD            [facility] - [facility]              OPTN/UNOS: Your Resource for Organ Transplant Information        If you have a question regarding your own medical care, you always should call your transplant center first. However, for general organ transplant-related information, you can call the United Network for Organ Sharing (UNOS) toll-free patient services line at [phone].    Anyone, including potential transplant candidates, recipients, family members/friends, living donors, and/or donor family members can call this number to:    · talk about organ donation, living donation, how transplant and donation work, the donation process, transplant policies, and transplant/donor information;  · get a free patient information kit with helpful booklets, waiting list and transplant information, and a list of all transplant centers;  · ask questions about the Organ Procurement and  Transplantation Network (OPTN) web site (www.optn.transplant.hrsa.gov); the UNOS Web site (www.unos.org); or the UNOS web site for living donors and transplant recipients (www.transplantliving.org);  · learn how UNOS and the OPTN can help you;  · talk about any concerns that you may have with a transplant center and how they perform    [facility] is a not-for-profit organization that provides all of the administrative services for the national OPTN under federal contract to the Health Resources and Services Administration (HRSA), an agency under the U.S. Department of Health and Human Services (HHS).     UNOS and OPTN responsibilities include:    · writing educational material for patients, the public and professionals;  · helping to make people aware of the need for donated organs and tissue;  · writing organ transplant policy with help from doctors, nurses, transplant patients/candidates, donor families, living donors, and the public;  · coordinating the organ matching and placement process;  · collecting information about every organ transplant and donation that occurs in the [place].    Remember, you should contact your transplant center directly if you have questions or concerns about your own medical care including medical records, work-up progress and test reports. [facility] is not your transplant center, and staff at [facility] will not be able to transfer you to your transplant center, so keep your transplant centers phone number handy. But, while you research your transplant needs and learn as much as you can about transplantation and donation, we welcome your call to our toll-free patient services line at [phone].

## 2020-01-15 NOTE — PROGRESS NOTES
Casandra Pack medical records reviewed with . Provider determined that due to having potential safety issues that the patient will be made inactive on waitlist at this time.     Casandra Pack notified of safety concerns related to transplant at this time. Patients dialysis unit and Nephrologist notified of above concerns by letter.   Pt stated she is currently hospitalized at [facility], diagnosed with ulcers in the mouth. Discharge summary and H&P will be requested.

## 2020-02-18 ENCOUNTER — TELEPHONE (OUTPATIENT)
Dept: TRANSPLANT | Facility: CLINIC | Age: 70
End: 2020-02-18

## 2020-02-18 NOTE — TELEPHONE ENCOUNTER
Returned pt's call, no ans. Unable to leave msg, phone rang with no voice mail.    ----- Message from Luz Interiano sent at 2/14/2020  4:44 PM CST -----  Contact: pt      ----- Message -----  From: [place]  Sent: 2/14/2020   3:36 PM CST  To: Kidney Waitlisted Coordinator    Please call pt at [phone]    Patient was discharged from [facility] on 01/20/20 and calling about her kidney transplant status    Thank you

## 2020-04-15 DIAGNOSIS — Z76.82 ORGAN TRANSPLANT CANDIDATE: Primary | ICD-10-CM

## 2020-04-16 ENCOUNTER — TELEPHONE (OUTPATIENT)
Dept: OBSTETRICS AND GYNECOLOGY | Facility: CLINIC | Age: 70
End: 2020-04-16

## 2020-04-23 ENCOUNTER — TELEPHONE (OUTPATIENT)
Dept: TRANSPLANT | Facility: CLINIC | Age: 70
End: 2020-04-23

## 2020-06-17 ENCOUNTER — TELEPHONE (OUTPATIENT)
Dept: TRANSPLANT | Facility: CLINIC | Age: 70
End: 2020-06-17

## 2020-06-24 ENCOUNTER — TELEPHONE (OUTPATIENT)
Dept: CARDIOLOGY | Facility: CLINIC | Age: 70
End: 2020-06-24

## 2020-06-26 ENCOUNTER — OFFICE VISIT (OUTPATIENT)
Dept: TRANSPLANT | Facility: CLINIC | Age: 70
End: 2020-06-26
Payer: MEDICARE

## 2020-06-26 ENCOUNTER — HOSPITAL ENCOUNTER (OUTPATIENT)
Dept: RADIOLOGY | Facility: HOSPITAL | Age: 70
Discharge: HOME | End: 2020-06-26
Attending: NURSE PRACTITIONER
Payer: MEDICARE

## 2020-06-26 ENCOUNTER — CLINICAL SUPPORT (OUTPATIENT)
Dept: CARDIOLOGY | Facility: CLINIC | Age: 70
End: 2020-06-26
Attending: NURSE PRACTITIONER
Payer: MEDICARE

## 2020-06-26 VITALS
OXYGEN SATURATION: 100 % | WEIGHT: 208.75 LBS | DIASTOLIC BLOOD PRESSURE: 68 MMHG | HEART RATE: 100 BPM | SYSTOLIC BLOOD PRESSURE: 150 MMHG | BODY MASS INDEX: 33.55 KG/M2 | TEMPERATURE: 98 F | HEIGHT: 66 IN | RESPIRATION RATE: 18 BRPM

## 2020-06-26 VITALS — BODY MASS INDEX: 37.67 KG/M2 | HEIGHT: 67 IN | WEIGHT: 240 LBS

## 2020-06-26 DIAGNOSIS — E03.9 ACQUIRED HYPOTHYROIDISM: ICD-10-CM

## 2020-06-26 DIAGNOSIS — N18.6 ESRD ON HEMODIALYSIS: Primary | Chronic | ICD-10-CM

## 2020-06-26 DIAGNOSIS — Z76.82 ORGAN TRANSPLANT CANDIDATE: ICD-10-CM

## 2020-06-26 DIAGNOSIS — E11.21 TYPE 2 DIABETES MELLITUS WITH DIABETIC NEPHROPATHY, UNSPECIFIED WHETHER LONG TERM INSULIN USE: Chronic | ICD-10-CM

## 2020-06-26 DIAGNOSIS — Z99.2 ESRD ON HEMODIALYSIS: Primary | Chronic | ICD-10-CM

## 2020-06-26 LAB
CV PHARM DOSE: 0.4 MG
CV STRESS BASE HR: 95 BPM
DIASTOLIC BLOOD PRESSURE: 69 MMHG
END DIASTOLIC INDEX-HIGH: 170 ML/M2
END SYSTOLIC INDEX-HIGH: 70 ML/M2
NUC REST DIASTOLIC VOLUME INDEX: 95
NUC REST EJECTION FRACTION: 69
NUC REST SYSTOLIC VOLUME INDEX: 29
NUC STRESS DIASTOLIC VOLUME INDEX: 110
NUC STRESS EJECTION FRACTION: 72 %
NUC STRESS SYSTOLIC VOLUME INDEX: 31
OHS CV CPX 85 PERCENT MAX PREDICTED HEART RATE MALE: 125
OHS CV CPX MAX PREDICTED HEART RATE: 147
OHS CV CPX PATIENT IS FEMALE: 1
OHS CV CPX PATIENT IS MALE: 0
OHS CV CPX PEAK DIASTOLIC BLOOD PRESSURE: 69 MMHG
OHS CV CPX PEAK HEAR RATE: 96 BPM
OHS CV CPX PEAK RATE PRESSURE PRODUCT: NORMAL
OHS CV CPX PEAK SYSTOLIC BLOOD PRESSURE: 147 MMHG
OHS CV CPX PERCENT MAX PREDICTED HEART RATE ACHIEVED: 65
OHS CV CPX RATE PRESSURE PRODUCT PRESENTING: NORMAL
RETIRED EF AND QEF - SEE NOTES: 51 %
SYSTOLIC BLOOD PRESSURE: 147 MMHG

## 2020-06-26 PROCEDURE — 76770 US RETROPERITONEAL COMPLETE: ICD-10-PCS | Mod: 26,TXP,, | Performed by: RADIOLOGY

## 2020-06-26 PROCEDURE — 93018 STRESS TEST WITH MYOCARDIAL PERFUSION (CUPID ONLY): ICD-10-PCS | Mod: S$PBB,TXP,, | Performed by: INTERNAL MEDICINE

## 2020-06-26 PROCEDURE — 93018 CV STRESS TEST I&R ONLY: CPT | Mod: S$PBB,TXP,, | Performed by: INTERNAL MEDICINE

## 2020-06-26 PROCEDURE — 99214 PR OFFICE/OUTPT VISIT, EST, LEVL IV, 30-39 MIN: ICD-10-PCS | Mod: S$PBB,TXP,, | Performed by: NURSE PRACTITIONER

## 2020-06-26 PROCEDURE — 99215 OFFICE O/P EST HI 40 MIN: CPT | Mod: PBBFAC,25,27,TXP | Performed by: NURSE PRACTITIONER

## 2020-06-26 PROCEDURE — 76770 US EXAM ABDO BACK WALL COMP: CPT | Mod: 26,TXP,, | Performed by: RADIOLOGY

## 2020-06-26 PROCEDURE — 93016 CV STRESS TEST SUPVJ ONLY: CPT | Mod: S$PBB,TXP,, | Performed by: INTERNAL MEDICINE

## 2020-06-26 PROCEDURE — 71046 X-RAY EXAM CHEST 2 VIEWS: CPT | Mod: TC,TXP

## 2020-06-26 PROCEDURE — 78452 STRESS TEST WITH MYOCARDIAL PERFUSION (CUPID ONLY): ICD-10-PCS | Mod: 26,S$PBB,TXP, | Performed by: INTERNAL MEDICINE

## 2020-06-26 PROCEDURE — 99999 PR PBB SHADOW E&M-EST. PATIENT-LVL I: ICD-10-PCS | Mod: PBBFAC,TXP,,

## 2020-06-26 PROCEDURE — 99211 OFF/OP EST MAY X REQ PHY/QHP: CPT | Mod: PBBFAC,25,TXP

## 2020-06-26 PROCEDURE — 99214 OFFICE O/P EST MOD 30 MIN: CPT | Mod: S$PBB,TXP,, | Performed by: NURSE PRACTITIONER

## 2020-06-26 PROCEDURE — 78452 HT MUSCLE IMAGE SPECT MULT: CPT | Mod: PBBFAC,TXP | Performed by: INTERNAL MEDICINE

## 2020-06-26 PROCEDURE — 99999 PR PBB SHADOW E&M-EST. PATIENT-LVL V: ICD-10-PCS | Mod: PBBFAC,TXP,, | Performed by: NURSE PRACTITIONER

## 2020-06-26 PROCEDURE — 99999 PR PBB SHADOW E&M-EST. PATIENT-LVL V: CPT | Mod: PBBFAC,TXP,, | Performed by: NURSE PRACTITIONER

## 2020-06-26 PROCEDURE — 93016 STRESS TEST WITH MYOCARDIAL PERFUSION (CUPID ONLY): ICD-10-PCS | Mod: S$PBB,TXP,, | Performed by: INTERNAL MEDICINE

## 2020-06-26 PROCEDURE — 76770 US EXAM ABDO BACK WALL COMP: CPT | Mod: TC,TXP

## 2020-06-26 PROCEDURE — 71046 XR CHEST PA AND LATERAL: ICD-10-PCS | Mod: 26,TXP,, | Performed by: RADIOLOGY

## 2020-06-26 PROCEDURE — 99999 PR PBB SHADOW E&M-EST. PATIENT-LVL I: CPT | Mod: PBBFAC,TXP,,

## 2020-06-26 PROCEDURE — 71046 X-RAY EXAM CHEST 2 VIEWS: CPT | Mod: 26,TXP,, | Performed by: RADIOLOGY

## 2020-06-26 RX ORDER — REGADENOSON 0.08 MG/ML
0.4 INJECTION, SOLUTION INTRAVENOUS
Status: COMPLETED | OUTPATIENT
Start: 2020-06-26 | End: 2020-06-26

## 2020-06-26 RX ADMIN — REGADENOSON 0.4 MG: 0.08 INJECTION, SOLUTION INTRAVENOUS at 09:06

## 2020-06-26 NOTE — PROGRESS NOTES
Transplant Recipient Adult Psychosocial Assessment UPDATE (Last Assessment completed on 2019)    Casandra Pack  [address]   [place] [address]  Telephone Information:   Mobile [phone]   Home  [phone] (home)  Work  There is no work phone number on file.  E-mail  No e-mail address on record    Sex: female  YOB: 1950  Age: 69 y.o.    Encounter Date: 2020  [place] Citizen: yes  Primary Language: English   Needed: no    Emergency Contact:  Name: Daria Pack  Relationship: daughter  Address:  [address] [place] [address]  Phone Numbers:  [phone] (mobile)    Family/Social Support:   Number of dependents/: pt has a dog: Ira which will be cared for by family   Marital history:  once for 46 yrs to current   Other family dynamics: Pt reports parents ; three  sisters and one brother who is living.   Pt reports supportive adult children and . Dtr is an RN and son is a NP.    Household Composition:  Name: Casandra Pakc  Age: 69  Relationship: patient  Does person drive? yes    Name: Rodriguez Pack  Age: 71  Relationship:   Does person drive? yes    Do you and your caregivers have access to reliable transportation? yes  PRIMARY CAREGIVER: Daria Pack (daughter) will be primary caregiver, phone number [phone].      provided in-depth information to patient and caregiver regarding pre- and post-transplant caregiver role.   strongly encourages patient and caregiver to have concrete plan regarding post-transplant care giving, including back-up caregiver(s) to ensure care giving needs are met as needed.    Patient and Caregiver states understanding all aspects of caregiver role/commitment and is able/willing/committed to being caregiver to the fullest extent necessary.    Patient and Caregiver verbalizes understanding of the education provided today  and caregiver responsibilities.         remains available. Patient and Caregiver agree to contact  in a timely manner if concerns arise.      Able to take time off work without financial concerns: yes.     Additional Significant Others who will Assist with Transplant:  Name: Rodriguez [phone]  Age: 71  City: [place] State: [place]  Relationship:   Does person drive? yes    Name: Billy Pack  [phone]  Age: 36  City: [place] State: [place]  Relationship: son  Does person drive? yes    Living Will: no  Healthcare Power of : no  Advance Directives on file: <<no information> per medical record.  Verbally reviewed LW/HCPA information.   provided patient with copy of LW/HCPA documents and provided education on completion of forms.    Living Donors: No. Education and resource information given to patient.    Highest Education Level: Attended College/Technical School  Reading Ability: college  Reports difficulty with: N/A  Learns Best By:  Verbal and written instruction     Status: no  VA Benefits: no     Working for Income: No  If no, reason not working: Patient Choice - Retired  Patient is retired from [employer]..    Spouse/Significant Other Employment: Retired from working [employer]    Disabled: yes: date disability began: 7/2006, due to: ESRD.    Monthly Income:  Other: $4000 (wife and 's income)     Able to afford all costs now and if transplanted, including medications: yes. Pt reports that her children can assist if needed, but doesn't think that she will need the assistance.  Patient and Caregiver verbalizes understanding of personal responsibilities related to transplant costs and the importance of having a financial plan to ensure that patients transplant costs are fully covered.       provided fundraising information/education. Patient and Caregiver verbalizes understanding.   remains  available.    Insurance:   Payor/Plan Subscr  Sex Relation Sub. Ins. ID Effective Group Num   1. MEDICARE - ME* TITUS MILLAN 1950 Female  [id number]                                    [address]   2. [employer] AS* TITUS MILLAN 1950 Female  [id number]                                    [address]     Primary Insurance (for UNOS reporting): Public Insurance - Medicare FFS (Fee For Service)  Secondary Insurance (for UNOS reporting): Private Insurance (Pt pays through former employer)  Patient and Caregiver verbalizes clear understanding that patient may experience difficulty obtaining and/or be denied insurance coverage post-surgery. This includes and is not limited to disability insurance, life insurance, health insurance, burial insurance, long term care insurance, and other insurances.      Patient and Caregiver also reports understanding that future health concerns related to or unrelated to transplantation may not be covered by patient's insurance.  Resources and information provided and reviewed.     Patient and Caregiver provides verbal permission to release any necessary information to outside resources for patient care and discharge planning.  Resources and information provided are reviewed.      Dialysis Adherence: Patient reports being on hemodialysis in center, attending all dialysis appointments, and staying for the entire course of treatment. SW was not able to complete an adherence check at this time and will complete one at a later date. SW faxed an adherence form (see Letters section) and is awaiting a fax back.     Infusion Service: patient utilizing? no  Home Health: patient utilizing? no, not currently. Pt reports HH in 2020 for SN and PT after a hospitalization. Pt reports she was only seen for 3 days.  DME: yes BP Cuff and glucometer  Pulmonary/Cardiac Rehab: none   ADLS:  Pt reports no difficulties with driving, walking, bathing, cooking,  "housekeeping, eating, shopping, and taking medication.    Adherence:   Pt reports suitable adherence with medications, dialysis, and health regimen.  Adherence education and counseling provided.     Per History Section:  Past Medical History:   Diagnosis Date    Acquired hypothyroidism 8/17/2016    Chronic rheumatic arthritis     Remicade 4160-7610, stopped d/t recurrent infection (skin abscesses)    Diabetes mellitus, type 2 since 1978 (age 28)     ESRD from DM; since Mar 2016 on hemodialysis     Peptic ulcer disease with hemorrhage 2016    Psoriasis      Social History     Tobacco Use    Smoking status: Never Smoker    Smokeless tobacco: Never Used   Substance Use Topics    Alcohol use: No     Social History     Substance and Sexual Activity   Drug Use No     Social History     Substance and Sexual Activity   Sexual Activity Not Currently    Partners: Male    Birth control/protection: Post-menopausal       Per Today's Psychosocial:  Tobacco: none, patient denies any use.  Alcohol: none, patient denies any use.  Illicit Drugs/Non-prescribed Medications: none, patient denies any use.    Patient and Caregiver states clear understanding of the potential impact of substance use as it relates to transplant candidacy and is aware of possible random substance screening.  Substance abstinence/cessation counseling, education and resources provided and reviewed.     Arrests/DWI/Treatment/Rehab: patient denies    Psychiatric History:    Mental Health: Pt reports a history of depression and anger when first starting dialysis. Pt states, "I was mad", but after a few weeks was able to come to terms with dialysis.  Psychiatrist/Counselor: Pt denies seeing a mental health professional and reports being open to seeing the psych department for talk therapy if necessary.  Medications:  Pt reports taking Lexapro, Trazadone, and Ambien in the past and reports that she no longer needs these medications after she adjusted to " dialysis.  Suicide/Homicide Issues: Pt denies any history of or current suicidal or homicidal ideations.   Safety at home: Pt reports no current or history of safety concerns in household; including mental, physical, verbal, or sexual abuse.    Knowledge: Patient and Caregiver states having clear understanding and realistic expectations regarding the potential risks and potential benefits of organ transplantation and organ donation and agrees to discuss with health care team members and support system members, as well as to utilize available resources and express questions and/or concerns in order to further facilitate the pt informed decision-making.  Resources and information provided and reviewed.    Patient and Caregiver is aware of [facility]'s affiliation and/or partnership with agencies in home health care, LTAC, SNF, [facility], and other hospitals and clinics.    Understanding: Patient and Caregiver reports having a clear understanding of the many lifetime commitments involved with being a transplant recipient, including costs, compliance, medications, lab work, procedures, appointments, concrete and financial planning, preparedness, timely and appropriate communication of concerns, abstinence (ETOH, tobacco, illicit non-prescribed drugs), adherence to all health care team recommendations, support system and caregiver involvement, appropriate and timely resource utilization and follow-through, mental health counseling as needed/recommended, and patient and caregiver responsibilities.  Social Service Handbook, resources and detailed educational information provided and reviewed.  Educational information provided.    Patient and Caregiver also reports current and expected compliance with health care regime and states having a clear understanding of the importance of compliance.      Patient and Caregiver reports a clear understanding that risks and benefits may be involved with organ transplantation and with organ  donation.       Patient and Caregiver also reports clear understanding that psychosocial risk factors may affect patient, and include but are not limited to feelings of depression, generalized anxiety, anxiety regarding dependence on others, post traumatic stress disorder, feelings of guilt and other emotional and/or mental concerns, and/or exacerbation of existing mental health concerns.  Detailed resources provided and discussed.      Patient and Caregiver agrees to access appropriate resources in a timely manner as needed and/or as recommended, and to communicate concerns appropriately.  Patient and Caregiver also reports a clear understanding of treatment options available.     Patient and Caregiver received education in a group setting.   reviewed education, provided additional information, and answered questions.    Feelings or Concerns: Patient and Caregiver did not express any concerns at this time. Patient reports high motivation to pursue transplant at this time.    Coping:   Pt stated she is coping with dialysis and being listed for transplant.  Pt enjoys shopping, playing [employer], going to the [employer] and crocheting.      Goals: Pt reports getting off dialysis, feeling better, and having more free time as goals for post transplant.    Interview Behavior: Patient and Caregiver presents as alert and oriented x 4, pleasant, good eye contact, well groomed, recall good, concentration/judgement good, average intelligence, calm, communicative, cooperative and asking and answering questions appropriately. Pt presents with Daria Pack, pt's daughter at pt's request.         Transplant Social Work - Candidacy  Assessment/Plan:     Psychosocial Suitability: Patient presents as an acceptable candidate for kidney transplant at this time. Based on psychosocial risk factors, patient presents as low risk, due to suitable caregiver plan in place, adequate history of dialysis adherence, and financial plan  in place.      Recommendations/Additional Comments: SW recommends that pt conduct fundraising to assist pt with pay for cost of medications, food, gas, and other transplant related needs. SW recommends that pt remain aware of potential mental health concerns and contact the team if any concerns arise. SW recommends that pt remain abstinent from tobacco, ETOH, and drug use. SW supports pt's continued adherence. SW remains available to answer any questions or concerns that arise as the pt moves through the transplant process.       Bel Ocampo, TONYW

## 2020-06-26 NOTE — PROGRESS NOTES
Kidney Transplant Recipient Reevalulation    Referring Physician: Mara Tomas  Current Nephrologist: Mara Tomas  Waitlist Status: inactive  Dialysis Start Date: 3/17/2016    Subjective:     CC:  Six month reassessment of kidney transplant candidacy.    HPI:  Ms. Pack is a 69 y.o. year old White female with ESRD secondary to diabetic nephropathy.  She has been on the wait list for a kidney transplant at [facility] since 3/17/2016. Patient is currently on hemodialysis started on 3/17/2016. Patient is dialyzing on TTS schedule.  Patient reports that she is tolerating dialysis well.. She has a LUE AV fistula. Dialyzes for 4 hours per session.Patient denies any recent hospitalizations or ED visits.    Patient has been on the inactive list for the following hospitalizations  Jan 2020: viral ulcerations (Herpes simplex) in her oral cavity which she required iv antivirals. Hospitalization lasted 8 days.    March 2020: Hospital for pneumonia and required iv antibiotics and breathing treatments. Was not tested for COVID.     Pain to neck wrist and hip requiring steroid injections.     Psoriasis--stable, no problem in years  RA in remission, no meds, f/u with rheumatology in [place]--no recent flares  Does not exercise but is able to do housework and errands without problems, not frail. Only ambulated outside when it is time to go to dialysis three times a week.     Previous Transplant: no  Previous Blood Transfusion: yes last dec 2017  Previous Pregnancy: 2  Previous neurogenic bladder/ urine incontinence no  Anticoagulation/ antiplatelet therapy and reason: no  Potential Donor: no  High KDPI candidate: no   Meets center eligibility for accepting HCV+ donor offer: yes      CXR: 9/27/19 unremarkable  Renal US: 9/13/19: unremarkable  MMG: 10/24/18benign findings needs repeat now   PAP: no pap needs follow-up  ECHO: 3/10/2020 EF 60% and PA 20 (in listed paperwork  Stress: pending results  PTH:  46  Colonoscopy: 6/17/2019 poly 3year follow-up June 2022    Past Medical History:   Diagnosis Date    Acquired hypothyroidism 8/17/2016    Chronic rheumatic arthritis     Remicade 6890-6531, stopped d/t recurrent infection (skin abscesses)    Diabetes mellitus, type 2 since 1978 (age 28)     ESRD from DM; since Mar 2016 on hemodialysis     Peptic ulcer disease with hemorrhage 2016    Psoriasis        Review of Systems   Constitutional: Negative for activity change, appetite change, chills, fatigue, fever and unexpected weight change.   HENT: Negative for congestion, facial swelling, postnasal drip, rhinorrhea, sinus pressure, sore throat and trouble swallowing.    Eyes: Positive for visual disturbance. Negative for pain and redness.        Wears glasses   Respiratory: Negative for cough, chest tightness, shortness of breath and wheezing.    Cardiovascular: Negative.  Negative for chest pain, palpitations and leg swelling.   Gastrointestinal: Negative for abdominal pain, diarrhea, nausea and vomiting.   Genitourinary: Negative for dysuria, flank pain and urgency.        Still makes a good amount of urine    Musculoskeletal: Negative for gait problem, neck pain and neck stiffness.   Skin: Negative for rash.   Allergic/Immunologic: Negative for environmental allergies, food allergies and immunocompromised state.   Neurological: Positive for numbness (feet, neck and wrist. Has received steriod injections to hip wrist and neck). Negative for dizziness, weakness, light-headedness and headaches.   Psychiatric/Behavioral: Negative for agitation and confusion. The patient is not nervous/anxious.        Objective:   body mass index is 33.96 kg/m².  BP Readings from Last 3 Encounters:   06/26/20 (!) 150/68   09/27/19 (!) 160/70   09/27/19 (!) 160/73       Physical Exam  Vitals signs reviewed.   Constitutional:       Appearance: Normal appearance. She is well-developed.   HENT:      Head: Normocephalic.       Mouth/Throat:      Pharynx: No oropharyngeal exudate.   Eyes:      General: No scleral icterus.     Conjunctiva/sclera: Conjunctivae normal.      Pupils: Pupils are equal, round, and reactive to light.   Neck:      Musculoskeletal: Normal range of motion and neck supple.   Cardiovascular:      Rate and Rhythm: Normal rate and regular rhythm.      Heart sounds: Normal heart sounds.   Pulmonary:      Effort: Pulmonary effort is normal.      Breath sounds: Normal breath sounds.   Abdominal:      General: Bowel sounds are normal. There is no distension.      Palpations: Abdomen is soft. There is no hepatomegaly, splenomegaly or mass.      Tenderness: There is no abdominal tenderness. There is no guarding or rebound. Negative signs include Cao's sign and McBurney's sign.   Musculoskeletal: Normal range of motion.   Lymphadenopathy:      Cervical: No cervical adenopathy.   Skin:     General: Skin is warm and dry.   Neurological:      Mental Status: She is alert and oriented to person, place, and time.      Motor: No abnormal muscle tone.      Coordination: Coordination normal.   Psychiatric:         Behavior: Behavior normal.         Labs:  Lab Results   Component Value Date    WBC 7.67 08/17/2016    HGB 11.6 (L) 08/17/2016    HCT 35.8 (L) 08/17/2016     08/17/2016    K 4.4 08/17/2016     08/17/2016    CO2 22 (L) 08/17/2016    BUN 44 (H) 08/17/2016    CREATININE 4.6 (H) 08/17/2016    EGFRNONAA 9.5 (A) 08/17/2016    CALCIUM 9.1 08/17/2016    PHOS 4.5 08/17/2016    ALBUMIN 3.2 (L) 08/17/2016    AST 27 08/17/2016    ALT 19 08/17/2016    PTH 46.0 06/26/2020       No results found for: PREALBUMIN, BILIRUBINUA, GGT, AMYLASE, LIPASE, PROTEINUA, NITRITE, RBCUA, WBCUA    No results found for: HLAABCTYPE    Lab Results   Component Value Date    CPRA 30 05/05/2020    XF4XPLI B37 05/05/2020    CIABCLM Inconclusive 05/05/2020    CIIAB DR16,DR15 05/05/2020    ABCMT Inconclusive 05/05/2020       Labs were reviewed with  the patient.    Pre-transplant Workup:   Reviewed with the patient.    Assessment:     1. ESRD from DM; since Mar 2016 on hemodialysis    2. Organ transplant candidate    3. Type 2 diabetes mellitus with diabetic nephropathy, unspecified whether long term insulin use    4. Acquired hypothyroidism        Plan:       MMG, pelvic exam due now  CXR and Renal US pending  Request records on path on polyp bx in 6/17/19. Venkatesh Alvauyen  ABD/Pelvix CT form 3/2019 noted to have 3.3 cm left lower lobe ground-glass opacity.recommend follow up with non-contrast chest CT at 3-6 months to confirm persistence.---Request records from previous hospitalization from Jan 2020 and March 2020 to determine if chest CT was completed and obtain records.  Chest CT non contrast  Pulmonary referral       Transplant Candidacy:   Ms. Pack is a suitable kidney transplant candidate.  Meets center eligibility for accepting HCV+ donor offer - yes.  Patient educated on HCV+ donors. Casandra is willing  to accept HCV+ donor offer -  yes   Patient is a candidate for KDPI > 85 kidney donor offer - no d/t weight.  She will be placed in an inactive status at present due to lack in up to date MMG pelvix exam. Needs CT chest non contrast, and pulmonary consult.    Cely Kern NP     Counseling:  Exercise: reminded patient of the importance of regular exercise for weight management, blood sugar and blood pressure management.  I also explained exercise has been shown to improve cardiovascular health, energy level, and sleep hygiene.  Lastly, I advised her that cardiovascular complications are leading cause of death for renal transplant recipients, and regular exercise can help lower this risk.    We discussed various aspects of kidney transplantation including transplant surgery, immunosuppressive medications and the need for close follow up. We also discussed side effects of immunosuppression including weight gain, hypertension,  hyperlipidemia, new-onset diabetes after transplantation, infections and malignancies, especially skin cancers and lymphomas. I also reviewed the risk of acute rejection, vascular thrombosis, recurrent disease and potential transmission of infections such as hepatitis and HIV. I informed the patient that the average time on the wait list in the [facility] is between 5 to 7 years.     Follow-up:   In addition to the tests noted in the plan, Ms. Pack will continue to have reevaluation as per the standing pre-kidney transplant protocol:  1. Monthly blood for PRA  2. Annual return to clinic, except HIV positive, > 65 years of age, or pancreas transplant candidates who will be scheduled to see transplant every 6 months while in pre-transplant phase  3. Annual re-testing: CXR, EKG, yearly mammograms for women over 40 and PSA for males over 40, cardiology follow-up as recommended by initial cardiology pre-transplant evaluation  4. Renal ultrasound every 2 years  5. Baseline colonoscopy after age 50 and repeated as recommended    UNOS Patient Status  Functional Status: 60% - Requires occasional assistance but is able to care for needs  Physical Capacity: No Limitations

## 2020-06-26 NOTE — LETTER
June 29, 2020        Mara Tomas  [address]  [place] [address]  Phone: [phone]  Fax: [phone]             [facility] Trans[facility]  [address]  [place] [address]  Phone: [phone]   Patient: Casandra Pack   MR Number: [id number]   YOB: 1950   Date of Visit: 6/26/2020       Dear Dr. Mara Tomas    Thank you for referring Casandra Pack to me for evaluation. Attached you will find relevant portions of my assessment and plan of care.    If you have questions, please do not hesitate to call me. I look forward to following Casandra Pack along with you.    Sincerely,    Cely Kern, NP    Enclosure    If you would like to receive this communication electronically, please contact [email or web address] or [phone] to request "[employer] Link access.    "[employer] Link is a tool which provides read-only access to select patient information with whom you have a relationship. Its easy to use and provides real time access to review your patients record including encounter summaries, notes, results, and demographic information.    If you feel you have received this communication in error or would no longer like to receive these types of communications, please e-mail [email or web address]

## 2020-06-26 NOTE — LETTER
Date: 6/26/2020          Listed Patient      To: Dialysis Unit  and Charge RN From: Bel Ocampo LCSW        Kidney Transplant   RE: Casandra Pack, 1950, [id number]     At [facility], we conduct adherence checks as an important part of transplant care. Initial and listed patient assessments are not complete without adherence information.        Please complete the following information:     Current Dry Weight: ___________         Most Recent Pre-Treatment Weight: ___________ /date: _________                    Data from the last 3 months:  (data from last 3 months preferred):    Number of AMAs with dates, time, and reasons: ____________________________________________________    ______________________________________________________________________________________________    ______________________________________________________________________________________________    Number of No-Shows with dates and reasons: ______________________________________________________      ______________________________________________________________________________________________    Last intact PTH:  ___________/date: __________      Any concerns with Labs:  YES / NO      If yes, please explain:  ___________________________________________________________________________    ______________________________________________________________________________________________    Any concerns with Caregivers:  YES / NO    If yes, please explain:  ___________________________________________________________________________    ______________________________________________________________________________________________     Any concerns with Transportation:  YES / NO    If yes, please explain:  ___________________________________________________________________________    ______________________________________________________________________________________________    Any  Psychiatric and/or Psychosocial concerns:  YES / NO     If yes, please explain: ___________________________________________________________________________    ______________________________________________________________________________________________      PLEASE RETURN TO: FAX: [phone]     Thank you for collaborating with us in the care of this patient.           [address]  ?  [place] [address]  ?  phone [phone]  ?  fax [phone] ?  [email or web address]  Confidentiality notice: The accompanying facsimile is intended solely for the use of the recipient designated above. Document(s) transmitted herewith may contain information that is confidential and privileged. Delivery, distribution or dissemination of this communication other than to the intended recipient is strictly prohibited. If you have received this facsimile in error, please notify us immediately by telephone.

## 2020-06-29 NOTE — PROGRESS NOTES
Patient's chart reviewed today. Patient due for follow-up in December 2020. Appointments will be scheduled per protocol. HLA sample current, in lab, and being received on a regular basis. Cardiology consult TBS d/t abn stress test. Pt states mmg is scheduled & she will call back once completed. Colonoscopy op report & path to be requested from Dr. Huan Owens, [phone].

## 2020-06-29 NOTE — PROGRESS NOTES
LISTED PATIENT EDUCATION NOTE    Ms. Casandra Pack was seen in LISTED kidney transplant clinic for evaluation for kidney, kidney/pancreas or pancreas only transplant.  The patient attended a an individual video education session that discussed/reviewed the following aspects of transplantation: evaluation and selection committee process, UNOS waitlist management/multiple listings, types of organs offered (KDPI < 85%, KDPI > 85%, PHS increased risk, DCD, HCV+, HIV+ for HIV+ recipients and enbloc/dual), financial aspects, surgical procedures, dietary instruction pre- and post-transplant, health maintenance pre- and post-transplant, post-transplant hospitalization and outpatient follow-up, potential to participate in a research protocol, and medication management and side effects.  A question and answer session was provided after the presentation.    The patient was seen by all members of the multi-disciplinary team to include: Nephrologist/PA, Surgeon, , Transplant Coordinator, , Pharmacist and Dietician (if applicable).    The patient reviewed and signed all consents for evaluation which were witnessed and sent to scanning into the [facility] chart.    The patient was given an education book and plan for further evaluation based on her individual assessment.      The patient was encouraged to call with any questions or concerns.

## 2020-07-16 ENCOUNTER — SOCIAL WORK (OUTPATIENT)
Dept: TRANSPLANT | Facility: CLINIC | Age: 70
End: 2020-07-16

## 2020-08-07 ENCOUNTER — OFFICE VISIT (OUTPATIENT)
Dept: CARDIOLOGY | Facility: CLINIC | Age: 70
End: 2020-08-07
Payer: MEDICARE

## 2020-08-07 ENCOUNTER — OFFICE VISIT (OUTPATIENT)
Dept: OBSTETRICS AND GYNECOLOGY | Facility: CLINIC | Age: 70
End: 2020-08-07
Payer: MEDICARE

## 2020-08-07 VITALS
HEART RATE: 87 BPM | BODY MASS INDEX: 31.5 KG/M2 | DIASTOLIC BLOOD PRESSURE: 60 MMHG | HEIGHT: 68 IN | WEIGHT: 207.88 LBS | SYSTOLIC BLOOD PRESSURE: 131 MMHG

## 2020-08-07 VITALS
HEIGHT: 68 IN | BODY MASS INDEX: 31.34 KG/M2 | WEIGHT: 206.81 LBS | DIASTOLIC BLOOD PRESSURE: 70 MMHG | SYSTOLIC BLOOD PRESSURE: 130 MMHG

## 2020-08-07 DIAGNOSIS — R94.39 ABNORMAL FINDING ON CARDIOVASCULAR STRESS TEST: ICD-10-CM

## 2020-08-07 DIAGNOSIS — Z01.419 WELL WOMAN EXAM WITH ROUTINE GYNECOLOGICAL EXAM: Primary | ICD-10-CM

## 2020-08-07 DIAGNOSIS — R94.39 ABNORMAL STRESS TEST: Primary | ICD-10-CM

## 2020-08-07 DIAGNOSIS — E11.21 TYPE 2 DIABETES MELLITUS WITH DIABETIC NEPHROPATHY, UNSPECIFIED WHETHER LONG TERM INSULIN USE: Chronic | ICD-10-CM

## 2020-08-07 DIAGNOSIS — Z76.82 ORGAN TRANSPLANT CANDIDATE: ICD-10-CM

## 2020-08-07 DIAGNOSIS — Z99.2 ESRD ON HEMODIALYSIS: Chronic | ICD-10-CM

## 2020-08-07 DIAGNOSIS — N18.6 ESRD ON HEMODIALYSIS: Chronic | ICD-10-CM

## 2020-08-07 PROCEDURE — G0101 CA SCREEN;PELVIC/BREAST EXAM: HCPCS | Mod: S$PBB,,, | Performed by: OBSTETRICS & GYNECOLOGY

## 2020-08-07 PROCEDURE — 99999 PR PBB SHADOW E&M-EST. PATIENT-LVL IV: CPT | Mod: PBBFAC,TXP,, | Performed by: INTERNAL MEDICINE

## 2020-08-07 PROCEDURE — 99204 PR OFFICE/OUTPT VISIT, NEW, LEVL IV, 45-59 MIN: ICD-10-PCS | Mod: S$PBB,TXP,, | Performed by: INTERNAL MEDICINE

## 2020-08-07 PROCEDURE — G0101 CA SCREEN;PELVIC/BREAST EXAM: HCPCS | Mod: PBBFAC,TXP | Performed by: OBSTETRICS & GYNECOLOGY

## 2020-08-07 PROCEDURE — 99213 OFFICE O/P EST LOW 20 MIN: CPT | Mod: PBBFAC,25,TXP | Performed by: OBSTETRICS & GYNECOLOGY

## 2020-08-07 PROCEDURE — 99999 PR PBB SHADOW E&M-EST. PATIENT-LVL IV: ICD-10-PCS | Mod: PBBFAC,TXP,, | Performed by: INTERNAL MEDICINE

## 2020-08-07 PROCEDURE — G0101 PR CA SCREEN;PELVIC/BREAST EXAM: ICD-10-PCS | Mod: S$PBB,,, | Performed by: OBSTETRICS & GYNECOLOGY

## 2020-08-07 PROCEDURE — 99999 PR PBB SHADOW E&M-EST. PATIENT-LVL III: ICD-10-PCS | Mod: PBBFAC,,, | Performed by: OBSTETRICS & GYNECOLOGY

## 2020-08-07 PROCEDURE — 99999 PR PBB SHADOW E&M-EST. PATIENT-LVL III: CPT | Mod: PBBFAC,,, | Performed by: OBSTETRICS & GYNECOLOGY

## 2020-08-07 PROCEDURE — 99214 OFFICE O/P EST MOD 30 MIN: CPT | Mod: PBBFAC,25,27,TXP | Performed by: INTERNAL MEDICINE

## 2020-08-07 PROCEDURE — 99204 OFFICE O/P NEW MOD 45 MIN: CPT | Mod: S$PBB,TXP,, | Performed by: INTERNAL MEDICINE

## 2020-08-07 NOTE — PROGRESS NOTES
History & Physical  Gynecology      SUBJECTIVE:     Chief Complaint: Well Woman       History of Present Illness:  Annual Exam-Postmenopausal  Patient presents for annual exam. The patient has no complaints today. Patient denies post-menopausal vaginal bleeding.   The patient is not sexually active. The patient is not taking hormone replacement therapy.  The patient participates in regular exercise: no.  She does not smoke.     GYN screening history: hysterectomy for bleeding, ovaries remain  Mammogram history: 2020  Colonoscopy history:    Dexa history: done 2017 per patient, last year    FH:   Breast cancer: neg  Colon cancer: mother and sister and brother  Ovarian cancer: neg    Review of patient's allergies indicates:   Allergen Reactions    Levaquin [levofloxacin]     Omega-3 acid ethyl esters        Past Medical History:   Diagnosis Date    Acquired hypothyroidism 2016    Chronic rheumatic arthritis     Remicade 7884-1235, stopped d/t recurrent infection (skin abscesses)    Diabetes mellitus, type 2 since  (age 28)     ESRD from DM; since Mar 2016 on hemodialysis     Peptic ulcer disease with hemorrhage 2016    Psoriasis      Past Surgical History:   Procedure Laterality Date    APPENDECTOMY      AV FISTULA PLACEMENT Left     upper arm    CATARACT EXTRACTION Bilateral     CERVICAL SPINE SURGERY      for ruptured disc    CYST REMOVAL Left     calf, medial aspect LLE    gout deposit removal Right     foot    HYSTERECTOMY      for bleeding; ovaries in place    INCISION AND DRAINAGE OF WOUND      x 6 since     ROTATOR CUFF REPAIR Left     TONSILLECTOMY       OB History        2    Para   2    Term   2            AB        Living   2       SAB        TAB        Ectopic        Multiple        Live Births   2               Family History   Problem Relation Age of Onset    Cancer Mother 55        colon    Colon cancer Mother     Aneurysm Father     Heart disease  Sister     Diabetes Sister     Hypertension Sister     Heart disease Brother     Cancer Sister         lung    Diabetes Sister     Hypertension Sister     Cancer Sister 74        colon    Diabetes Sister     Hypertension Sister     Kidney disease Neg Hx     Breast cancer Neg Hx     Ovarian cancer Neg Hx      Social History     Tobacco Use    Smoking status: Never Smoker    Smokeless tobacco: Never Used   Substance Use Topics    Alcohol use: No    Drug use: No       Current Outpatient Medications   Medication Sig    allopurinol (ZYLOPRIM) 100 MG tablet Take 100 mg by mouth 3 (three) times a week.    atorvastatin (LIPITOR) 80 MG tablet Take 80 mg by mouth once daily.    BREO ELLIPTA 100-25 mcg/dose diskus inhaler     DIALYVITE 100-1 mg Tab TK 1 T PO  QD    escitalopram oxalate (LEXAPRO) 5 MG Tab Take 5 mg by mouth once daily.    fenofibrate 160 MG Tab Take 1 tablet by mouth once daily.    insulin lispro (HUMALOG) 100 unit/mL injection Inject 25 Units into the skin 3 (three) times daily before meals.    levothyroxine (SYNTHROID, LEVOTHROID) 175 MCG tablet Take 175 mcg by mouth once daily.    linagliptin (TRADJENTA) 5 mg Tab tablet Take 5 mg by mouth once daily.    liraglutide 0.6 mg/0.1 mL, 18 mg/3 mL, subq PNIJ (VICTOZA 2-BISHOP) 0.6 mg/0.1 mL (18 mg/3 mL) PnIj Inject 0.6 mg into the skin once daily. 1.8 ml daily    pantoprazole (PROTONIX) 40 MG tablet Take 40 mg by mouth once daily.    sevelamer carbonate (RENVELA) 800 mg Tab Take 2,400 mg by mouth 3 (three) times daily with meals.      No current facility-administered medications for this visit.        Review of Systems:  Review of Systems   Constitutional: Negative for activity change, appetite change and fever.   Respiratory: Negative for shortness of breath.    Cardiovascular: Negative for chest pain.   Gastrointestinal: Negative for abdominal pain, constipation, diarrhea, nausea and vomiting.   Genitourinary: Negative for menorrhagia,  menstrual problem, pelvic pain, vaginal bleeding, vaginal discharge and vaginal pain.   Integumentary:  Negative for nipple discharge.   Neurological: Negative for numbness and headaches.   Breast: Negative for mastodynia and nipple discharge       OBJECTIVE:     Physical Exam:  Physical Exam  Vitals signs and nursing note reviewed.   Constitutional:       Appearance: She is well-developed.   Neck:      Musculoskeletal: Normal range of motion and neck supple.      Thyroid: No thyromegaly.      Trachea: No tracheal deviation.   Cardiovascular:      Rate and Rhythm: Normal rate and regular rhythm.      Heart sounds: Normal heart sounds. No murmur. No friction rub. No gallop.    Pulmonary:      Effort: Pulmonary effort is normal. No respiratory distress.      Breath sounds: Normal breath sounds. No wheezing or rales.   Chest:      Breasts: Breasts are symmetrical.         Right: No inverted nipple, mass, nipple discharge, skin change or tenderness.         Left: No inverted nipple, mass, nipple discharge, skin change or tenderness.   Abdominal:      General: Bowel sounds are normal. There is no distension.      Palpations: Abdomen is soft.      Tenderness: There is no abdominal tenderness. There is no guarding or rebound.   Genitourinary:     Labia:         Right: No rash, tenderness, lesion or injury.         Left: No rash, tenderness, lesion or injury.       Vagina: Normal. No signs of injury and foreign body. No vaginal discharge, erythema, tenderness or bleeding.      Adnexa:         Right: No mass, tenderness or fullness.          Left: No mass, tenderness or fullness.        Comments: Urethra: normal appearing urethra with no masses, tenderness or lesions  Urethral meatus: normal size, anterior vaginal wall with no prolapse, no lesions  Neurological:      Mental Status: She is alert and oriented to person, place, and time.   Psychiatric:         Behavior: Behavior normal.         Thought Content: Thought content  normal.         Judgment: Judgment normal.         Chaperoned by: Corin    ASSESSMENT:       ICD-10-CM ICD-9-CM    1. Well woman exam with routine gynecological exam  Z01.419 V72.31           Plan:      Casandra was seen today for well woman.    Diagnoses and all orders for this visit:    Well woman exam with routine gynecological exam        No orders of the defined types were placed in this encounter.      Well Woman:   - Pap smear: no longer needed  - Mammogram: patient brought report, scanned in  - Colonoscopy: up to date  - Dexa: up to date  - Immunizations: with pcp  - Labs: with pcp  - Exercise counseling provided    Follow up in one year for annual, or prn.    Maryam Hutchison

## 2020-08-07 NOTE — LETTER
August 10, 2020      Kathryn Leal, TAZ  [address] [facility]  1st Floor Clinic  [facility] [address]           [facility]  [address] [facility] [address]  Phone: [phone]          Patient: Casandra Pack   MR Number: [id number]   YOB: 1950   Date of Visit: 8/7/2020       Dear Kathryn Leal:    Thank you for referring Casandra Pack to me for evaluation. Attached you will find relevant portions of my assessment and plan of care.    If you have questions, please do not hesitate to call me. I look forward to following Casandra Pack along with you.    Sincerely,    Keyur Jeronimo MD    Enclosure  CC:  No Recipients    If you would like to receive this communication electronically, please contact [email or web address] or [phone] to request more information on [employer] access.    For providers and/or their staff who would like to refer a patient to [facility], please contact us through our one-stop-shop provider referral line, [facility], at [phone].    If you feel you have received this communication in error or would no longer like to receive these types of communications, please e-mail [email or web address]

## 2020-08-07 NOTE — LETTER
August 7, 2020      Kathryn Leal, TAZ  [address]  [facility]  1st Floor Clinic  [facility] [address]           [facility] - OB/GYN 5th Floor  [address] [facility] [address]  Phone: [phone]          Patient: Casandra Pack   MR Number: [id number]   YOB: 1950   Date of Visit: 8/7/2020       Dear Kathryn Leal:    Thank you for referring Casandra Pack to me for evaluation. Attached you will find relevant portions of my assessment and plan of care.    If you have questions, please do not hesitate to call me. I look forward to following Casandra Pack along with you.    Sincerely,    Maryam Hutchison MD    Enclosure  CC:  No Recipients    If you would like to receive this communication electronically, please contact [email or web address] or [phone] to request more information on [employer] access.    For providers and/or their staff who would like to refer a patient to [facility], please contact us through our one-stop-shop provider referral line, [facility], at [phone].    If you feel you have received this communication in error or would no longer like to receive these types of communications, please e-mail [email or web address]

## 2020-08-07 NOTE — Clinical Note
Can we fax a copy of this note to Dr. Marsh with [facility]?  Phone number [phone].  Thanks!    Keyur

## 2020-08-10 DIAGNOSIS — R07.9 CHEST PAIN, UNSPECIFIED TYPE: ICD-10-CM

## 2020-08-10 DIAGNOSIS — R94.39 ABNORMAL STRESS TEST: Primary | ICD-10-CM

## 2020-08-10 DIAGNOSIS — Z01.812 PRE-PROCEDURE LAB EXAM: ICD-10-CM

## 2020-08-10 DIAGNOSIS — R94.39 ABNORMAL FINDING ON CARDIOVASCULAR STRESS TEST: Primary | ICD-10-CM

## 2020-08-10 RX ORDER — DEXTROSE MONOHYDRATE AND SODIUM CHLORIDE 5; .45 G/100ML; G/100ML
INJECTION, SOLUTION INTRAVENOUS CONTINUOUS
Status: CANCELLED | OUTPATIENT
Start: 2020-08-10

## 2020-08-10 RX ORDER — DIPHENHYDRAMINE HCL 25 MG
50 CAPSULE ORAL ONCE
Status: CANCELLED | OUTPATIENT
Start: 2020-08-10 | End: 2020-08-10

## 2020-08-10 NOTE — PROGRESS NOTES
Subjective:   Chief Complaint: Abnormal Stress Test  Last Clinic Visit: New Patient    History of Present Illness: Casandra Pack is a 69 y.o. lady with ESRD on HD, diabetes, hypothyroidism, PUD, hypertension, hyperlipidemia, who presents for cardiology evaluation after recent abnormal stress test.  Her main nephrologist is Dr. Cobos at [phone], who recently had an abnormal SPECT stress test showing an area of ischemia with underlying scar in LAD distribution.  No prior coronary history, she had a SPECT stress test one year prior which was interpreted as breast artifact.  Baseline right bundle-branch block on EKG, with poor R-wave progression.  No symptoms during stress test, and no significant EKG changes.  She had an admission in March for pneumonia, was not tested for COVID, found to have elevated troponins at that time attributed to pneumonia.  Currently denies any chest pain with activities, no fever, dialysis 3 times a week, no issues.  Reports history of chronic kidney disease for 25 years, and diabetes longer than this.  Is not extremely active at baseline, denies any orthopnea, no change in dyspnea on exertion, no tobacco history.  Planning on carpal tunnel surgery as well with Dr. Marsh, would like pre-op.    Dx:  ESRD on HD  Diabetes  Hypothyroidism  Peptic ulcer disease  Hypertension  Hyperlipidemia    Past medical and surgical history reviewed as documented.    Review of Systems   Constitution: Negative.   HENT: Negative.    Eyes: Negative.    Cardiovascular: Negative.    Respiratory: Negative.    Hematologic/Lymphatic: Negative.    Skin: Negative.    Musculoskeletal: Negative.    Gastrointestinal: Negative.    Genitourinary: Negative.      Medications:  Outpatient Encounter Medications as of 8/7/2020   Medication Sig Dispense Refill    allopurinol (ZYLOPRIM) 100 MG tablet Take 100 mg by mouth 3 (three) times a week.      atorvastatin (LIPITOR) 80 MG tablet Take 80 mg by mouth once  daily.      BREO ELLIPTA 100-25 mcg/dose diskus inhaler       DIALYVITE 100-1 mg Tab TK 1 T PO  QD  11    escitalopram oxalate (LEXAPRO) 5 MG Tab Take 5 mg by mouth once daily.      fenofibrate 160 MG Tab Take 1 tablet by mouth once daily.  3    insulin lispro (HUMALOG) 100 unit/mL injection Inject 25 Units into the skin 3 (three) times daily before meals.      levothyroxine (SYNTHROID, LEVOTHROID) 175 MCG tablet Take 175 mcg by mouth once daily.      linagliptin (TRADJENTA) 5 mg Tab tablet Take 5 mg by mouth once daily.      liraglutide 0.6 mg/0.1 mL, 18 mg/3 mL, subq PNIJ (VICTOZA 2-BISHOP) 0.6 mg/0.1 mL (18 mg/3 mL) PnIj Inject 0.6 mg into the skin once daily. 1.8 ml daily      pantoprazole (PROTONIX) 40 MG tablet Take 40 mg by mouth once daily.      sevelamer carbonate (RENVELA) 800 mg Tab Take 2,400 mg by mouth 3 (three) times daily with meals.       [DISCONTINUED] amlodipine (NORVASC) 5 MG tablet Take 5 mg by mouth 2 (two) times daily.      [DISCONTINUED] cholecalciferol, vitamin D3, (VITAMIN D3) 2,000 unit Cap Take 1 capsule by mouth once a week.      [DISCONTINUED] insulin glargine (LANTUS) 100 unit/mL injection Inject 40 Units into the skin every evening.       [DISCONTINUED] nystatin (MYCOSTATIN) cream CRISTHIAN EXT AA  QHS  0    [DISCONTINUED] torsemide (DEMADEX) 5 MG Tab Take 20 mg by mouth once daily.      [DISCONTINUED] traZODone (DESYREL) 50 MG tablet Take 50 mg by mouth every evening.        No facility-administered encounter medications on file as of 8/7/2020.      Family History:  Casandra's family history includes Aneurysm in her father; Cancer in her sister; Cancer (age of onset: 55) in her mother; Cancer (age of onset: 74) in her sister; Colon cancer in her mother; Diabetes in her sister, sister, and sister; Heart disease in her brother and sister; Hypertension in her sister, sister, and sister.    Social History:  Casandra reports that she has never smoked. She has never used smokeless  "tobacco. She reports that she does not drink alcohol or use drugs.    Objective:   /60 (BP Location: Right arm, Patient Position: Sitting, BP Method: X-Large (Automatic))   Pulse 87   Ht 5' 8" (1.727 m)   Wt 94.3 kg (207 lb 14.3 oz)   BMI 31.61 kg/m²     Physical Exam   Constitutional: She is oriented to person, place, and time and well-developed, well-nourished, and in no distress. No distress.   HENT:   Head: Normocephalic and atraumatic.   Mouth/Throat: No oropharyngeal exudate.   Eyes: EOM are normal. No scleral icterus.   Neck: No JVD present. No tracheal deviation present. No thyromegaly present.   Cardiovascular: Normal rate and regular rhythm. Exam reveals no gallop and no friction rub.   Murmur heard.  Pulmonary/Chest: Effort normal and breath sounds normal. No respiratory distress. She has no wheezes. She has no rales. She exhibits no tenderness.   Abdominal: Soft. She exhibits no distension. There is no abdominal tenderness. There is no rebound and no guarding.   Musculoskeletal: Normal range of motion.         General: No edema.   Neurological: She is alert and oriented to person, place, and time.   Skin: Skin is warm and dry. She is not diaphoretic. No erythema.   Psychiatric: Affect normal.     EKG:  My independent visualization of most recent EKG is normal sinus rhythm right bundle-branch block    SPECT stress test  06/26/2020    Abnormal myocardial perfusion study.    There is a large sized, moderate intensity, mostly reversible defect with some fixed areas in the mid to apical anteroseptal wall(s) consistent with ischemia with a small amount of underlying scar in the typical distribution of the LAD territory.    Gated perfusion images showed an ejection fraction of 69% at rest and 72% post stress. Normal ejection fraction is greater than 51%.    There is normal wall motion at rest.    There is anteroseptal wall hypokinesis at stress.    LV cavity size is normal at rest and normal at " stress.    The EKG portion of this study is negative for ischemia.    The patient reported no chest pain during the stress test.    There were no arrhythmias during stress.    When compared to the prior study on 9/14/2018, there is a new reversible defect in the LAD territory consistent with ischemia with a small amount of underlying scar.     TTE:  09/27/2019  · Normal left ventricular systolic function. The estimated ejection fraction is 65%  · Significant Concentric left ventricular remodeling.  · Moderate left atrial enlargement.  · Mild right ventricular enlargement.  · Low normal right ventricular systolic function.  · Grade I (mild) left ventricular diastolic dysfunction consistent with impaired relaxation.  · Mild mitral sclerosis.  · Mild tricuspid regurgitation.  · The estimated PA systolic pressure is 34 mm Hg  · Intermediate central venous pressure (8 mm Hg).     Lipids:     outside provider  Renal:      Liver:      Assessment:     1. Abnormal stress test    2. Abnormal finding on cardiovascular stress test    3. ESRD from DM; since Mar 2016 on hemodialysis    4. BMI 37.0-37.9, adult    5. Type 2 diabetes mellitus with diabetic nephropathy, unspecified whether long term insulin use    6. Organ transplant candidate      Plan:   1. Abnormal stress test  Given significantly abnormal SPECT stress test, possible breast artifact, but multiple coronary risk factors including ESRD, diabetes, hypertension, offered her PET stress testing versus left heart catheterization, she would like to proceed with left heart catheterization, will refer to Interventional Cardiology.  Doubtful that she would be able to be listed without further workup, will thus workup potential coronary disease.  - Ambulatory referral/consult to Interventional Cardiology; Future    2.  Preoperative evaluation - From a preoperative standpoint she has revised cardiac risk index factors of ESRD on HD, diabetes, and now potential coronary  disease on most recent SPECT stress test.  Given that she is relatively asymptomatic, coronary disease felt to be stable.  However this does increase her perioperative risk.  While it increases her risk, it does not entirely exclude her from surgery.  Based on these revised cardiac risk index factors, she has a 15% risk of major adverse cardiac event, this includes postoperative asymptomatic troponin  elevation in the majority of cases.  Would place her risk of clinically apparent myocardial infarction at 1.2% on the basis of the Briones risk score.  Proceed on risk benefit basis.    3. ESRD from DM; since Mar 2016 on hemodialysis      4. BMI 37.0-37.9, adult      5. Type 2 diabetes mellitus with diabetic nephropathy, unspecified whether long term insulin use      6. Organ transplant candidate      Follow up in about 1 year (around 8/7/2021).      Keyur Jeronimo MD [facility]

## 2020-08-17 ENCOUNTER — TELEPHONE (OUTPATIENT)
Dept: CARDIOLOGY | Facility: CLINIC | Age: 70
End: 2020-08-17

## 2020-08-17 NOTE — TELEPHONE ENCOUNTER
Patient is scheduled to see Dr Rodriguez is September for pos stress needing angiogram.  She did not want to come before that date.  Called today and she states she has an infection in her toe that has infected to bone and will need amputation.  Will keep appointment for now and instructed her to call  to the date.

## 2020-09-21 ENCOUNTER — EDUCATION (OUTPATIENT)
Dept: CARDIOLOGY | Facility: CLINIC | Age: 70
End: 2020-09-21

## 2020-09-21 ENCOUNTER — LAB VISIT (OUTPATIENT)
Dept: SURGERY | Facility: CLINIC | Age: 70
End: 2020-09-21
Payer: MEDICARE

## 2020-09-21 ENCOUNTER — INITIAL CONSULT (OUTPATIENT)
Dept: CARDIOLOGY | Facility: CLINIC | Age: 70
End: 2020-09-21
Payer: MEDICARE

## 2020-09-21 VITALS
OXYGEN SATURATION: 98 % | DIASTOLIC BLOOD PRESSURE: 63 MMHG | HEIGHT: 68 IN | HEART RATE: 87 BPM | SYSTOLIC BLOOD PRESSURE: 141 MMHG | BODY MASS INDEX: 31.44 KG/M2 | WEIGHT: 207.44 LBS

## 2020-09-21 DIAGNOSIS — E11.21 TYPE 2 DIABETES MELLITUS WITH DIABETIC NEPHROPATHY, UNSPECIFIED WHETHER LONG TERM INSULIN USE: Chronic | ICD-10-CM

## 2020-09-21 DIAGNOSIS — Z99.2 ESRD ON HEMODIALYSIS: Chronic | ICD-10-CM

## 2020-09-21 DIAGNOSIS — I50.32 CHRONIC DIASTOLIC HF (HEART FAILURE): ICD-10-CM

## 2020-09-21 DIAGNOSIS — I25.10 CORONARY ARTERY DISEASE WITHOUT ANGINA PECTORIS, UNSPECIFIED VESSEL OR LESION TYPE, UNSPECIFIED WHETHER NATIVE OR TRANSPLANTED HEART: ICD-10-CM

## 2020-09-21 DIAGNOSIS — R94.39 ABNORMAL STRESS TEST: ICD-10-CM

## 2020-09-21 DIAGNOSIS — E78.5 HYPERLIPIDEMIA, UNSPECIFIED HYPERLIPIDEMIA TYPE: ICD-10-CM

## 2020-09-21 DIAGNOSIS — R94.39 ABNORMAL FINDING ON CARDIOVASCULAR STRESS TEST: ICD-10-CM

## 2020-09-21 DIAGNOSIS — Z01.812 PRE-PROCEDURE LAB EXAM: ICD-10-CM

## 2020-09-21 DIAGNOSIS — R07.9 CHEST PAIN, UNSPECIFIED TYPE: ICD-10-CM

## 2020-09-21 DIAGNOSIS — N18.6 ESRD ON HEMODIALYSIS: Chronic | ICD-10-CM

## 2020-09-21 DIAGNOSIS — K25.9 GASTRIC ULCER, UNSPECIFIED CHRONICITY, UNSPECIFIED WHETHER GASTRIC ULCER HEMORRHAGE OR PERFORATION PRESENT: ICD-10-CM

## 2020-09-21 DIAGNOSIS — D64.9 ANEMIA, UNSPECIFIED TYPE: ICD-10-CM

## 2020-09-21 LAB — SARS-COV-2 RNA RESP QL NAA+PROBE: NOT DETECTED

## 2020-09-21 PROCEDURE — 99214 OFFICE O/P EST MOD 30 MIN: CPT | Mod: PBBFAC,TXP | Performed by: INTERNAL MEDICINE

## 2020-09-21 PROCEDURE — U0003 INFECTIOUS AGENT DETECTION BY NUCLEIC ACID (DNA OR RNA); SEVERE ACUTE RESPIRATORY SYNDROME CORONAVIRUS 2 (SARS-COV-2) (CORONAVIRUS DISEASE [COVID-19]), AMPLIFIED PROBE TECHNIQUE, MAKING USE OF HIGH THROUGHPUT TECHNOLOGIES AS DESCRIBED BY CMS-2020-01-R: HCPCS | Mod: TXP

## 2020-09-21 PROCEDURE — 99999 PR PBB SHADOW E&M-EST. PATIENT-LVL IV: CPT | Mod: PBBFAC,TXP,, | Performed by: INTERNAL MEDICINE

## 2020-09-21 PROCEDURE — 99204 OFFICE O/P NEW MOD 45 MIN: CPT | Mod: S$PBB,TXP,, | Performed by: INTERNAL MEDICINE

## 2020-09-21 PROCEDURE — 99204 PR OFFICE/OUTPT VISIT, NEW, LEVL IV, 45-59 MIN: ICD-10-PCS | Mod: S$PBB,TXP,, | Performed by: INTERNAL MEDICINE

## 2020-09-21 PROCEDURE — 99999 PR PBB SHADOW E&M-EST. PATIENT-LVL IV: ICD-10-PCS | Mod: PBBFAC,TXP,, | Performed by: INTERNAL MEDICINE

## 2020-09-21 RX ORDER — PREDNISONE 5 MG/1
TABLET ORAL
COMMUNITY
Start: 2020-07-15 | End: 2020-09-21

## 2020-09-21 RX ORDER — SULFAMETHOXAZOLE AND TRIMETHOPRIM 800; 160 MG/1; MG/1
TABLET ORAL
COMMUNITY
Start: 2020-06-14 | End: 2020-09-21

## 2020-09-21 RX ORDER — FOLIC ACID 0.8 MG
800 TABLET ORAL DAILY
COMMUNITY

## 2020-09-21 RX ORDER — FUROSEMIDE 80 MG/1
TABLET ORAL
COMMUNITY
Start: 2020-06-20

## 2020-09-21 RX ORDER — LINEZOLID 600 MG/1
TABLET, FILM COATED ORAL
COMMUNITY
Start: 2020-09-01 | End: 2020-09-21

## 2020-09-21 RX ORDER — GABAPENTIN 100 MG/1
CAPSULE ORAL
COMMUNITY
Start: 2020-08-20

## 2020-09-21 NOTE — ASSESSMENT & PLAN NOTE
H/H 8.8/28.5 today. Reported baseline hemoglobin is around 10. She reports anemia requiring transfusion before dialysis 1 week ago. No reported visible blood in stool or urine.

## 2020-09-21 NOTE — PROGRESS NOTES
"OUTPATIENT CATHETERIZATION INSTRUCTIONS    You have been scheduled for a procedure in the catheterization lab on Tuesday, September 22, 2020.     Please report to the Cardiology Waiting Area on the Third floor of the hospital and check in at 7:30 AM.   You will then be taken to the SSCU (Short Stay Cardiac Unit) and prepared for your procedure. Please be aware that this is not the time of your procedure but the time you are to arrive. The procedures are scheduled on an hourly basis; however, emergency cases take precedence over all other cases.       You may not have anything to eat or drink after midnight the night before your test. You may take your regular morning medications with water. If there are any medications that you should not take you will be instructed to hold them that morning. If you are diabetic and on Metformin (Glucophage) do not take it the day before, the day of, and for 2 days after your procedure.      The procedure will take 1-2 hours to perform. After the procedure, you will return to SSCU on the third floor of the hospital. You will need to lie still (or keep your arm still) for the next 4 to 6 hours to minimize bleeding from the puncture site. Your family may remain in the room with you during this time.       You may be able to be discharged home that same afternoon if there is someone to drive you home and there were no complications. If you have one of the balloon, stent, or device procedures you may spend the night in the hospital. Your doctor will determine, based on your progress, the date and time of your discharge. The results of your procedure will be discussed with you before you are discharged. Any further testing or procedures will be scheduled for you either before you leave or you will be called with these appointments.       If you should have any questions, concerns, or need to change the date of your procedure, please call ROSY Venegas @ [phone]",    Special " Instructions:          THE ABOVE INSTRUCTIONS WERE GIVEN TO THE PATIENT VERBALLY AND THEY VERBALIZED UNDERSTANDING.  THEY DO NOT REQUIRE ANY SPECIAL NEEDS AND DO NOT HAVE ANY LEARNING BARRIERS.          Directions for Reporting to Cardiology Waiting Area in the Hospital  If you park in the Parking Garage:  Take elevators to the1st floor of the parking garage.  Continue past the gift shop, coffee shop, and piano.  Take a right and go to the gold elevators. (Elevator B)  Take the elevator to the 3rd floor.  Follow the arrow on the sign on the wall that says Cath Lab Registration/EP Lab Registration.  Follow the long hallway all the way around until you come to a big open area.  This is the registration area.  Check in at Reception Desk.    OR    If family is dropping you off:  Have them drop you off at the front of the Hospital under the green overhang.  Enter through the doors and take a right.  Take the E elevators to the 3rd floor Cardiology Waiting Area.  Check in at the Reception Desk in the waiting room.

## 2020-09-21 NOTE — ASSESSMENT & PLAN NOTE
There is a large sized, moderate intensity, mostly reversible defect with some fixed areas in the mid to apical anteroseptal wall(s) consistent with ischemia with a small amount of underlying scar in the typical distribution of the LAD territory. When compared to the prior study on 9/14/2018, there is a new reversible defect in the LAD territory consistent with ischemia with a small amount of underlying scar.

## 2020-09-21 NOTE — PROGRESS NOTES
Subjective:    Patient ID:  Casandra Pack is a 69 y.o. female who presents for evaluation of Abnormal Stress Test      Referring Physician: Dr. Keyur Jeronimo  Nephrologist: Dr. Papito SHRESTHA  Casandra Pack is a 69 year old female with ESRD on HD, diabetes, hypothyroidism, PUD, hypertension, hyperlipidemia, who presents for evaluation after recent abnormal SPECT stress test which showed new, reversible, LAD defect. She is without cardiac complaints today. She denies any recent CP or YARBROUGH. She reports no symptoms during stress test, and there were no significant EKG changes. She did recently require a blood transfusion 2 weeks ago after they found her to be anemic during dialysis. She reports no personal history of coronary disease. Family history is significant for a sister requiring coronary intervention in her 30's and a brother in his 60's. No tobacco history.  Planning on carpal tunnel surgery as well with Dr. Marsh, would like pre-op.      Review of Systems   Constitution: Negative for chills and fever.   HENT: Negative for sore throat.    Eyes: Negative for blurred vision.   Cardiovascular: Negative for chest pain, claudication, cyanosis, dyspnea on exertion, irregular heartbeat, leg swelling, near-syncope, orthopnea, palpitations, paroxysmal nocturnal dyspnea and syncope.   Respiratory: Negative for cough and sputum production.    Hematologic/Lymphatic: Does not bruise/bleed easily.   Skin: Negative for itching, rash and suspicious lesions.   Musculoskeletal: Negative for falls.   Gastrointestinal: Negative for abdominal pain and change in bowel habit.   Genitourinary: Negative for dysuria.   Neurological: Negative for disturbances in coordination, dizziness and loss of balance.   Psychiatric/Behavioral: Negative for altered mental status.          Past Medical History:   Diagnosis Date    Acquired hypothyroidism 8/17/2016    CAD (coronary artery disease) 9/21/2020    Chronic rheumatic  arthritis     Remicade [id number], stopped d/t recurrent infection (skin abscesses)    Diabetes mellitus, type 2 since 1978 (age 28)     ESRD from DM; since Mar 2016 on hemodialysis     Peptic ulcer disease with hemorrhage 2016    Psoriasis        Current Outpatient Medications:     allopurinol (ZYLOPRIM) 100 MG tablet, Take 100 mg by mouth 3 (three) times a week., Disp: , Rfl:     atorvastatin (LIPITOR) 80 MG tablet, Take 80 mg by mouth once daily., Disp: , Rfl:     DIALYVITE 100-1 mg Tab, TK 1 T PO  QD, Disp: , Rfl: 11    escitalopram oxalate (LEXAPRO) 5 MG Tab, Take 5 mg by mouth once daily., Disp: , Rfl:     folic acid (FOLVITE) 800 MCG Tab, Take 800 mcg by mouth once daily., Disp: , Rfl:     furosemide (LASIX) 80 MG tablet, TK 1 T PO QD, Disp: , Rfl:     gabapentin (NEURONTIN) 100 MG capsule, TK 2 CS PO TID, Disp: , Rfl:     insulin lispro (HUMALOG) 100 unit/mL injection, Inject 25 Units into the skin 3 (three) times daily before meals., Disp: , Rfl:     levothyroxine (SYNTHROID, LEVOTHROID) 175 MCG tablet, Take 175 mcg by mouth once daily., Disp: , Rfl:     linagliptin (TRADJENTA) 5 mg Tab tablet, Take 5 mg by mouth once daily., Disp: , Rfl:     liraglutide 0.6 mg/0.1 mL, 18 mg/3 mL, subq PNIJ (VICTOZA 2-BISHOP) 0.6 mg/0.1 mL (18 mg/3 mL) PnIj, Inject 0.6 mg into the skin once daily. 1.8 ml daily, Disp: , Rfl:     pantoprazole (PROTONIX) 40 MG tablet, Take 40 mg by mouth once daily., Disp: , Rfl:     sevelamer carbonate (RENVELA) 800 mg Tab, Take 2,400 mg by mouth 3 (three) times daily with meals. , Disp: , Rfl:     Objective:    Physical Exam   Constitutional: She is oriented to person, place, and time. She appears well-developed and well-nourished. No distress.   HENT:   Head: Normocephalic and atraumatic.   Eyes: EOM are normal.   Neck: Normal range of motion. No JVD present.   Cardiovascular: Normal rate, regular rhythm and intact distal pulses.   No murmur heard.  Pulmonary/Chest: Effort  "normal and breath sounds normal. No respiratory distress.   Abdominal: Soft. She exhibits no distension.   Musculoskeletal:         General: No edema.   Neurological: She is alert and oriented to person, place, and time.   Skin: Skin is warm and dry. She is not diaphoretic.   Vitals reviewed.          Vitals:    09/21/20 1102   BP: (!) 141/63   BP Location: Right arm   Patient Position: Sitting   BP Method: Large (Automatic)   Pulse: 87   SpO2: 98%   Weight: 94.1 kg (207 lb 7.3 oz)   Height: 5' 8" (1.727 m)     Body mass index is 31.54 kg/m².    Test(s) Reviewed  I have reviewed the following in detail:  [] Stress test   [] Angiography   [] Echocardiogram   [] Labs:   [] Other:     SPECT stress test 6/26/20    Abnormal myocardial perfusion study.    There is a large sized, moderate intensity, mostly reversible defect with some fixed areas in the mid to apical anteroseptal wall(s) consistent with ischemia with a small amount of underlying scar in the typical distribution of the LAD territory.    Gated perfusion images showed an ejection fraction of 69% at rest and 72% post stress. Normal ejection fraction is greater than 51%.    There is normal wall motion at rest.    There is anteroseptal wall hypokinesis at stress.    LV cavity size is normal at rest and normal at stress.    The EKG portion of this study is negative for ischemia.    The patient reported no chest pain during the stress test.    There were no arrhythmias during stress.    When compared to the prior study on 9/14/2018, there is a new reversible defect in the LAD territory consistent with ischemia with a small amount of underlying scar.    Assessment:   Abnormal finding on cardiovascular stress test  There is a large sized, moderate intensity, mostly reversible defect with some fixed areas in the mid to apical anteroseptal wall(s) consistent with ischemia with a small amount of underlying scar in the typical distribution of the LAD territory. " When compared to the prior study on 9/14/2018, there is a new reversible defect in the LAD territory consistent with ischemia with a small amount of underlying scar.     Anemia  H/H 8.8/28.5 today. Reported baseline hemoglobin is around 10. She reports anemia requiring transfusion before dialysis 1 week ago. No reported visible blood in stool or urine.     CAD (coronary artery disease)  Asymptomatic. On ASA/Statin.     ESRD from DM; since Mar 2016 on hemodialysis  On HD TTS. On the kidney transplant list.     Chronic diastolic HF (heart failure)  Controlled. On Lasix 80 mg.     Gastric ulcer  History of bleeding gastric ulcer 2 years ago.    HLD (hyperlipidemia)  Stable on statin.     Diabetes mellitus, type 2 since 1978 (age 28)  Stable on insulin.    Plan:     1. Proceed with DIAGNOSTIC only angiogram tomorrow via right femoral access. We are unable load with ASA/Plavix with new onset anemia and history of bleeding gastric ulcers.  2. She needs to have a full anemia workup with her PCP. Will forward him this note.  3. If the angiogram shows significant blockage that would benefit from intervention, she will need to be seen back in clinic after her anemia workup is completed.      - The patient understands the risks and benefits and wishes to go ahead with the procedure.  - All patient's questions were answered.  -The risks, benefits & alternatives of the procedure were explained to the patient  -Discussed in detailed the risk of bleeding, infection, heart rhythm abnormalities, allergic reactions, kidney injury, stroke and death.    -The risks of sedation include hypotension, respiratory depression, arrhythmias, bronchospasm, & death.    -Informed consent was obtained & the patient is agreeable to proceed with the procedure.  -This patient was discussed with the attending cardiologist who agrees with the above assessment & plan.            Jyothi Allen PA-C  Interventional Cardiology  [facility]    Staff:  I have personally taken the history and examined this patient and agree with the fellow's note as stated above and amended it accordingly :-)  This patient has an abnormal stress test in preparation for listing for renal transplantation.  Will do coronary angiogram and possible intervention from CFA access to preserve her radial arteries for possible future dialysis access.  Her L toe amputation was from a callous that got infected, not vascular disease, as she has either normal pulses or triphasic doppler signals in each foot.    After leaving, she called back to cancel her coronary angiogram and have her anemia evaluated first back in [place].  She will call to reschedule here unless she wants to have her angiogram back home instead.

## 2020-09-21 NOTE — LETTER
September 21, 2020      Keyur Jeronimo MD  [address]  [facility] [address]           [facility] [address]  [address]  [facility] [address]  Phone: [phone]          Patient: Casandra Pack   MR Number: [id number]   YOB: 1950   Date of Visit: 9/21/2020       Dear Dr. Keyur Jeronimo:    Thank you for referring Casandra Pack to me for evaluation. Attached you will find relevant portions of my assessment and plan of care.    If you have questions, please do not hesitate to call me. I look forward to following Casandra Pack along with you.    Sincerely,    Ravinder Rodriguez MD    Enclosure  CC:  No Recipients    If you would like to receive this communication electronically, please contact [email or web address] or [phone] to request more information on [employer] Link access.    For providers and/or their staff who would like to refer a patient to [facility], please contact us through our one-stop-shop provider referral line, [facility], at [phone].    If you feel you have received this communication in error or would no longer like to receive these types of communications, please e-mail [email or web address]

## 2020-10-06 PROCEDURE — 99001 SPECIMEN HANDLING PT-LAB: CPT | Mod: PO,TXP

## 2020-10-13 ENCOUNTER — LAB VISIT (OUTPATIENT)
Dept: LAB | Facility: HOSPITAL | Age: 70
End: 2020-10-13
Payer: MEDICARE

## 2020-10-13 DIAGNOSIS — Z76.82 ORGAN TRANSPLANT CANDIDATE: ICD-10-CM

## 2020-10-13 NOTE — PROGRESS NOTES
Spoke w/pt regarding cards consult. Pt refused angiogram and recently had left toe amputation due to infected callous. Pt requests to be removed from the kidney transplant [facility].

## 2020-10-16 ENCOUNTER — COMMITTEE REVIEW (OUTPATIENT)
Dept: TRANSPLANT | Facility: CLINIC | Age: 70
End: 2020-10-16

## 2020-10-16 NOTE — COMMITTEE REVIEW
Native Organ Dx: Diabetes Mellitus - Type II      Not approved for LRD/CAD transplant due to patient request to be removed from the transplant waiting list.       Note written by Bernabe Barrientos RN    ===============================================    I was present at the meeting and attest to the decision of the committee

## 2020-10-21 LAB
HLA FREEZE AND HOLD INTERPRETATION: NORMAL
HLAFH COLLECTION DATE: NORMAL
HPRA INTERPRETATION: NORMAL

## 2020-11-03 PROCEDURE — 86977 RBC SERUM PRETX INCUBJ/INHIB: CPT | Mod: PO

## 2020-11-03 PROCEDURE — 86832 HLA CLASS I HIGH DEFIN QUAL: CPT | Mod: PO

## 2020-11-03 PROCEDURE — 86833 HLA CLASS II HIGH DEFIN QUAL: CPT | Mod: PO,TXP

## 2020-11-10 ENCOUNTER — LAB VISIT (OUTPATIENT)
Dept: LAB | Facility: HOSPITAL | Age: 70
End: 2020-11-10
Payer: MEDICARE

## 2020-11-10 DIAGNOSIS — Z76.82 ORGAN TRANSPLANT CANDIDATE: ICD-10-CM

## 2020-11-18 LAB — HPRA INTERPRETATION: NORMAL

## 2020-11-25 LAB
CLASS I ANTIBODIES - LUMINEX: NORMAL
CLASS I ANTIBODY COMMENTS - LUMINEX: NORMAL
CLASS II ANTIBODIES - LUMINEX: NORMAL
CLASS II ANTIBODY COMMENTS - LUMINEX: NORMAL
CPRA %: 6
CPRA %: 6
SERUM COLLECTION DT - LUMINEX CLASS I: NORMAL
SERUM COLLECTION DT - LUMINEX CLASS I: NORMAL
SERUM COLLECTION DT - LUMINEX CLASS II: NORMAL
SLAA1 TESTING DATE: NORMAL
SPCL1 TESTING DATE: NORMAL
SPCL2 TESTING DATE: NORMAL
SPLUA TESTING DATE: NORMAL

## 2021-03-05 ENCOUNTER — HISTORICAL (OUTPATIENT)
Dept: ADMINISTRATIVE | Facility: HOSPITAL | Age: 71
End: 2021-03-05

## 2021-03-10 LAB
FINAL CULTURE: NORMAL
FINAL CULTURE: NORMAL